# Patient Record
Sex: FEMALE | Race: WHITE | Employment: FULL TIME | URBAN - METROPOLITAN AREA
[De-identification: names, ages, dates, MRNs, and addresses within clinical notes are randomized per-mention and may not be internally consistent; named-entity substitution may affect disease eponyms.]

---

## 2022-01-16 ENCOUNTER — APPOINTMENT (OUTPATIENT)
Dept: CT IMAGING | Age: 72
DRG: 177 | End: 2022-01-16
Payer: MEDICARE

## 2022-01-16 ENCOUNTER — HOSPITAL ENCOUNTER (INPATIENT)
Age: 72
LOS: 3 days | Discharge: HOME | DRG: 177 | End: 2022-01-19
Attending: EMERGENCY MEDICINE | Admitting: INTERNAL MEDICINE
Payer: MEDICARE

## 2022-01-16 DIAGNOSIS — E87.6 HYPOKALEMIA: ICD-10-CM

## 2022-01-16 DIAGNOSIS — R26.2 UNABLE TO AMBULATE: ICD-10-CM

## 2022-01-16 DIAGNOSIS — M62.82 NON-TRAUMATIC RHABDOMYOLYSIS: ICD-10-CM

## 2022-01-16 DIAGNOSIS — U07.1 COVID-19: Primary | ICD-10-CM

## 2022-01-16 LAB
ALBUMIN SERPL-MCNC: 3.3 G/DL (ref 3.5–5.2)
ALP BLD-CCNC: 79 U/L (ref 35–104)
ALT SERPL-CCNC: 26 U/L (ref 0–32)
ANION GAP SERPL CALCULATED.3IONS-SCNC: 15 MMOL/L (ref 7–16)
AST SERPL-CCNC: 25 U/L (ref 0–31)
BACTERIA: ABNORMAL /HPF
BASOPHILS ABSOLUTE: 0.02 E9/L (ref 0–0.2)
BASOPHILS RELATIVE PERCENT: 0.1 % (ref 0–2)
BILIRUB SERPL-MCNC: 0.4 MG/DL (ref 0–1.2)
BILIRUBIN URINE: NEGATIVE
BLOOD, URINE: ABNORMAL
BUN BLDV-MCNC: 15 MG/DL (ref 6–23)
CALCIUM SERPL-MCNC: 8.7 MG/DL (ref 8.6–10.2)
CHLORIDE BLD-SCNC: 101 MMOL/L (ref 98–107)
CLARITY: CLEAR
CO2: 25 MMOL/L (ref 22–29)
COLOR: YELLOW
CREAT SERPL-MCNC: 0.4 MG/DL (ref 0.5–1)
EKG ATRIAL RATE: 96 BPM
EKG P AXIS: 68 DEGREES
EKG P-R INTERVAL: 168 MS
EKG Q-T INTERVAL: 418 MS
EKG QRS DURATION: 84 MS
EKG QTC CALCULATION (BAZETT): 528 MS
EKG R AXIS: 47 DEGREES
EKG T AXIS: 48 DEGREES
EKG VENTRICULAR RATE: 96 BPM
EOSINOPHILS ABSOLUTE: 0.02 E9/L (ref 0.05–0.5)
EOSINOPHILS RELATIVE PERCENT: 0.1 % (ref 0–6)
EPITHELIAL CELLS, UA: ABNORMAL /HPF
GFR AFRICAN AMERICAN: >60
GFR NON-AFRICAN AMERICAN: >60 ML/MIN/1.73
GLUCOSE BLD-MCNC: 137 MG/DL (ref 74–99)
GLUCOSE URINE: NEGATIVE MG/DL
HCT VFR BLD CALC: 41.9 % (ref 34–48)
HEMOGLOBIN: 13.8 G/DL (ref 11.5–15.5)
IMMATURE GRANULOCYTES #: 0.15 E9/L
IMMATURE GRANULOCYTES %: 0.9 % (ref 0–5)
KETONES, URINE: ABNORMAL MG/DL
LACTIC ACID, SEPSIS: 1.1 MMOL/L (ref 0.5–1.9)
LACTIC ACID, SEPSIS: 2.1 MMOL/L (ref 0.5–1.9)
LEUKOCYTE ESTERASE, URINE: NEGATIVE
LYMPHOCYTES ABSOLUTE: 1.73 E9/L (ref 1.5–4)
LYMPHOCYTES RELATIVE PERCENT: 10.1 % (ref 20–42)
MAGNESIUM: 1.8 MG/DL (ref 1.6–2.6)
MCH RBC QN AUTO: 28.5 PG (ref 26–35)
MCHC RBC AUTO-ENTMCNC: 32.9 % (ref 32–34.5)
MCV RBC AUTO: 86.4 FL (ref 80–99.9)
MONOCYTES ABSOLUTE: 0.98 E9/L (ref 0.1–0.95)
MONOCYTES RELATIVE PERCENT: 5.7 % (ref 2–12)
MUCUS: PRESENT /LPF
NEUTROPHILS ABSOLUTE: 14.17 E9/L (ref 1.8–7.3)
NEUTROPHILS RELATIVE PERCENT: 83.1 % (ref 43–80)
NITRITE, URINE: NEGATIVE
PDW BLD-RTO: 12.9 FL (ref 11.5–15)
PH UA: 6 (ref 5–9)
PLATELET # BLD: 581 E9/L (ref 130–450)
PMV BLD AUTO: 9.8 FL (ref 7–12)
POTASSIUM REFLEX MAGNESIUM: 2.7 MMOL/L (ref 3.5–5)
PRO-BNP: 1635 PG/ML (ref 0–125)
PROTEIN UA: 30 MG/DL
RBC # BLD: 4.85 E12/L (ref 3.5–5.5)
RBC UA: ABNORMAL /HPF (ref 0–2)
SARS-COV-2, NAAT: DETECTED
SODIUM BLD-SCNC: 141 MMOL/L (ref 132–146)
SPECIFIC GRAVITY UA: 1.02 (ref 1–1.03)
TOTAL CK: 416 U/L (ref 20–180)
TOTAL PROTEIN: 7 G/DL (ref 6.4–8.3)
TROPONIN, HIGH SENSITIVITY: 23 NG/L (ref 0–9)
UROBILINOGEN, URINE: 1 E.U./DL
WBC # BLD: 17.1 E9/L (ref 4.5–11.5)
WBC UA: ABNORMAL /HPF (ref 0–5)

## 2022-01-16 PROCEDURE — 82550 ASSAY OF CK (CPK): CPT

## 2022-01-16 PROCEDURE — 99223 1ST HOSP IP/OBS HIGH 75: CPT | Performed by: INTERNAL MEDICINE

## 2022-01-16 PROCEDURE — 1200000000 HC SEMI PRIVATE

## 2022-01-16 PROCEDURE — 6370000000 HC RX 637 (ALT 250 FOR IP): Performed by: EMERGENCY MEDICINE

## 2022-01-16 PROCEDURE — 87088 URINE BACTERIA CULTURE: CPT

## 2022-01-16 PROCEDURE — 96375 TX/PRO/DX INJ NEW DRUG ADDON: CPT

## 2022-01-16 PROCEDURE — 96376 TX/PRO/DX INJ SAME DRUG ADON: CPT

## 2022-01-16 PROCEDURE — 83735 ASSAY OF MAGNESIUM: CPT

## 2022-01-16 PROCEDURE — 87635 SARS-COV-2 COVID-19 AMP PRB: CPT

## 2022-01-16 PROCEDURE — 96361 HYDRATE IV INFUSION ADD-ON: CPT

## 2022-01-16 PROCEDURE — 6360000002 HC RX W HCPCS: Performed by: EMERGENCY MEDICINE

## 2022-01-16 PROCEDURE — 85025 COMPLETE CBC W/AUTO DIFF WBC: CPT

## 2022-01-16 PROCEDURE — 93005 ELECTROCARDIOGRAM TRACING: CPT | Performed by: GENERAL PRACTICE

## 2022-01-16 PROCEDURE — 99285 EMERGENCY DEPT VISIT HI MDM: CPT

## 2022-01-16 PROCEDURE — 72125 CT NECK SPINE W/O DYE: CPT

## 2022-01-16 PROCEDURE — 80053 COMPREHEN METABOLIC PANEL: CPT

## 2022-01-16 PROCEDURE — 83880 ASSAY OF NATRIURETIC PEPTIDE: CPT

## 2022-01-16 PROCEDURE — 93010 ELECTROCARDIOGRAM REPORT: CPT | Performed by: INTERNAL MEDICINE

## 2022-01-16 PROCEDURE — 51702 INSERT TEMP BLADDER CATH: CPT

## 2022-01-16 PROCEDURE — 81001 URINALYSIS AUTO W/SCOPE: CPT

## 2022-01-16 PROCEDURE — 96374 THER/PROPH/DIAG INJ IV PUSH: CPT

## 2022-01-16 PROCEDURE — 2580000003 HC RX 258: Performed by: GENERAL PRACTICE

## 2022-01-16 PROCEDURE — 83605 ASSAY OF LACTIC ACID: CPT

## 2022-01-16 PROCEDURE — 84484 ASSAY OF TROPONIN QUANT: CPT

## 2022-01-16 PROCEDURE — 70450 CT HEAD/BRAIN W/O DYE: CPT

## 2022-01-16 PROCEDURE — 74176 CT ABD & PELVIS W/O CONTRAST: CPT

## 2022-01-16 RX ORDER — POTASSIUM CHLORIDE 7.45 MG/ML
10 INJECTION INTRAVENOUS
Status: COMPLETED | OUTPATIENT
Start: 2022-01-16 | End: 2022-01-16

## 2022-01-16 RX ORDER — MAGNESIUM SULFATE IN WATER 40 MG/ML
2000 INJECTION, SOLUTION INTRAVENOUS ONCE
Status: COMPLETED | OUTPATIENT
Start: 2022-01-16 | End: 2022-01-16

## 2022-01-16 RX ORDER — 0.9 % SODIUM CHLORIDE 0.9 %
1000 INTRAVENOUS SOLUTION INTRAVENOUS ONCE
Status: COMPLETED | OUTPATIENT
Start: 2022-01-16 | End: 2022-01-16

## 2022-01-16 RX ADMIN — POTASSIUM CHLORIDE 10 MEQ: 7.45 INJECTION INTRAVENOUS at 18:14

## 2022-01-16 RX ADMIN — POTASSIUM CHLORIDE 10 MEQ: 7.45 INJECTION INTRAVENOUS at 19:41

## 2022-01-16 RX ADMIN — SODIUM CHLORIDE 1000 ML: 9 INJECTION, SOLUTION INTRAVENOUS at 16:13

## 2022-01-16 RX ADMIN — MAGNESIUM SULFATE HEPTAHYDRATE 2000 MG: 40 INJECTION, SOLUTION INTRAVENOUS at 18:14

## 2022-01-16 RX ADMIN — POTASSIUM BICARBONATE 20 MEQ: 782 TABLET, EFFERVESCENT ORAL at 18:14

## 2022-01-16 RX ADMIN — POTASSIUM CHLORIDE 10 MEQ: 7.45 INJECTION INTRAVENOUS at 21:48

## 2022-01-16 ASSESSMENT — PAIN DESCRIPTION - LOCATION: LOCATION: BACK

## 2022-01-16 ASSESSMENT — ENCOUNTER SYMPTOMS
EYE DISCHARGE: 0
SORE THROAT: 0
WHEEZING: 0
SINUS PRESSURE: 0
EYE PAIN: 0
ABDOMINAL DISTENTION: 0
BACK PAIN: 0
VOMITING: 0
NAUSEA: 0
EYE REDNESS: 0
COUGH: 1
SHORTNESS OF BREATH: 0
DIARRHEA: 0

## 2022-01-16 ASSESSMENT — PAIN SCALES - GENERAL: PAINLEVEL_OUTOF10: 8

## 2022-01-16 ASSESSMENT — PAIN DESCRIPTION - PAIN TYPE: TYPE: ACUTE PAIN

## 2022-01-16 ASSESSMENT — PAIN DESCRIPTION - FREQUENCY: FREQUENCY: CONTINUOUS

## 2022-01-16 ASSESSMENT — PAIN DESCRIPTION - ORIENTATION: ORIENTATION: RIGHT;LOWER

## 2022-01-16 NOTE — ED PROVIDER NOTES
ED  Provider Note  Admit Date/RoomTime: 1/16/2022  3:15 PM  ED Room: 05/05     HPI:   Venu Brewer is a 66 y.o. female presenting to the ED for fall, beginning yesterday evening ago. History comes primarily from the patient. There is no problem list on file for this patient. .           The complaint has been persistent, moderate in severity, improved by nothing and worsened by nothing. Associated symptoms include back pain. Nicky Shea has been having back pain consistent with her prior episodes of kidney infections over the course the last 3 weeks. Over the last 2-week she has felt extremely fatigued, and over the last 3 days she has been essentially bedbound secondary to this fatigue. Yesterday evening she attempted to get up to go to the bathroom and collapsed secondary to weakness, and was on the ground for approximately 17 hours. EMS eventually came and brought her to [address] [place] [address] emergency department for further evaluation and treatment. She is unclear if she hit her head in the fall. On arrival, the patient was assessed with history, physical exam, imaging studies, laboratory studies and ekg, vital signs. Vital signs were stable on arrival and the patient was afebrile. Review of Systems   Constitutional: Positive for activity change, appetite change and fatigue. Negative for chills and fever. HENT: Negative for ear pain, sinus pressure and sore throat. Eyes: Negative for pain, discharge and redness. Respiratory: Positive for cough. Negative for shortness of breath and wheezing. Cardiovascular: Negative for chest pain. Gastrointestinal: Negative for abdominal distention, diarrhea, nausea and vomiting. Genitourinary: Negative for dysuria and frequency. Musculoskeletal: Positive for gait problem and myalgias. Negative for arthralgias and back pain. Skin: Negative for rash and wound. Neurological: Negative for weakness and headaches.    Hematological: Negative for adenopathy. All other systems reviewed and are negative. Physical Exam  Vitals and nursing note reviewed. Constitutional:       General: She is not in acute distress. Appearance: She is well-developed. She is ill-appearing. She is not diaphoretic. HENT:      Head: Normocephalic and atraumatic. Eyes:      Pupils: Pupils are equal, round, and reactive to light. Cardiovascular:      Rate and Rhythm: Normal rate and regular rhythm. Pulmonary:      Effort: Pulmonary effort is normal. No respiratory distress. Breath sounds: Normal breath sounds. No wheezing or rales. Abdominal:      General: Bowel sounds are normal.      Palpations: Abdomen is soft. Tenderness: There is no abdominal tenderness. There is no guarding or rebound. Musculoskeletal:      Cervical back: Normal range of motion and neck supple. Skin:     General: Skin is warm and dry. Capillary Refill: Capillary refill takes less than 2 seconds. Neurological:      General: No focal deficit present. Mental Status: She is alert and oriented to person, place, and time. Cranial Nerves: No cranial nerve deficit. Coordination: Coordination normal.            EKG interpretation  Rate 96  Sinus rhythm  Normal axis  No ME, QRS, QT prolongation  No ST segment elevations or depressions  T wave inversions in precordial leads  Nonspecific EKG      Procedures     MDM  Number of Diagnoses or Management Options  COVID-19  Hypokalemia  Non-traumatic rhabdomyolysis  Unable to ambulate  Diagnosis management comments: Emergency department evaluation was notable for findings consistent with COVID-19, rhabdomyolysis, hypokalemia, inability to ambulate. The patient is at high risk of further decompensation would benefit from further evaluation and treatment in the inpatient setting. This information was relayed to the patient who understood this plan of care and was amenable to the plan.   Patient was discussed with the admitting service (Dr. Edith Fong) who concurred with the decision for admission, and have agreed to admit the patient to telemetry               --------------------------------------------- PAST HISTORY ---------------------------------------------  Past Medical History:  has a past medical history of Chronic kidney disease. Past Surgical History:  has a past surgical history that includes Appendectomy. Social History:  reports that she has never smoked. She has never used smokeless tobacco. She reports that she does not drink alcohol and does not use drugs. Family History: family history is not on file. The patients home medications have been reviewed.     Allergies: Codeine, Penicillins, and Sulfa antibiotics    -------------------------------------------------- RESULTS -------------------------------------------------    LABS:  Results for orders placed or performed during the hospital encounter of 01/16/22   COVID-19, Rapid    Specimen: Nasopharyngeal Swab   Result Value Ref Range    SARS-CoV-2, NAAT DETECTED (A) Not Detected   CBC Auto Differential   Result Value Ref Range    WBC 17.1 (H) 4.5 - 11.5 E9/L    RBC 4.85 3.50 - 5.50 E12/L    Hemoglobin 13.8 11.5 - 15.5 g/dL    Hematocrit 41.9 34.0 - 48.0 %    MCV 86.4 80.0 - 99.9 fL    MCH 28.5 26.0 - 35.0 pg    MCHC 32.9 32.0 - 34.5 %    RDW 12.9 11.5 - 15.0 fL    Platelets 355 (H) 361 - 450 E9/L    MPV 9.8 7.0 - 12.0 fL    Neutrophils % 83.1 (H) 43.0 - 80.0 %    Immature Granulocytes % 0.9 0.0 - 5.0 %    Lymphocytes % 10.1 (L) 20.0 - 42.0 %    Monocytes % 5.7 2.0 - 12.0 %    Eosinophils % 0.1 0.0 - 6.0 %    Basophils % 0.1 0.0 - 2.0 %    Neutrophils Absolute 14.17 (H) 1.80 - 7.30 E9/L    Immature Granulocytes # 0.15 E9/L    Lymphocytes Absolute 1.73 1.50 - 4.00 E9/L    Monocytes Absolute 0.98 (H) 0.10 - 0.95 E9/L    Eosinophils Absolute 0.02 (L) 0.05 - 0.50 E9/L    Basophils Absolute 0.02 0.00 - 0.20 E9/L   Comprehensive Metabolic Panel w/ Reflex to MG   Result Value Ref Range    Sodium 141 132 - 146 mmol/L    Potassium reflex Magnesium 2.7 (LL) 3.5 - 5.0 mmol/L    Chloride 101 98 - 107 mmol/L    CO2 25 22 - 29 mmol/L    Anion Gap 15 7 - 16 mmol/L    Glucose 137 (H) 74 - 99 mg/dL    BUN 15 6 - 23 mg/dL    CREATININE 0.4 (L) 0.5 - 1.0 mg/dL    GFR Non-African American >60 >=60 mL/min/1.73    GFR African American >60     Calcium 8.7 8.6 - 10.2 mg/dL    Total Protein 7.0 6.4 - 8.3 g/dL    Albumin 3.3 (L) 3.5 - 5.2 g/dL    Total Bilirubin 0.4 0.0 - 1.2 mg/dL    Alkaline Phosphatase 79 35 - 104 U/L    ALT 26 0 - 32 U/L    AST 25 0 - 31 U/L   Troponin   Result Value Ref Range    Troponin, High Sensitivity 23 (H) 0 - 9 ng/L   Brain Natriuretic Peptide   Result Value Ref Range    Pro-BNP 1,635 (H) 0 - 125 pg/mL   Lactate, Sepsis   Result Value Ref Range    Lactic Acid, Sepsis 2.1 (H) 0.5 - 1.9 mmol/L   CK   Result Value Ref Range    Total  (H) 20 - 180 U/L   Urinalysis, reflex to microscopic   Result Value Ref Range    Color, UA Yellow Straw/Yellow    Clarity, UA Clear Clear    Glucose, Ur Negative Negative mg/dL    Bilirubin Urine Negative Negative    Ketones, Urine TRACE (A) Negative mg/dL    Specific Gravity, UA 1.025 1.005 - 1.030    Blood, Urine SMALL (A) Negative    pH, UA 6.0 5.0 - 9.0    Protein, UA 30 (A) Negative mg/dL    Urobilinogen, Urine 1.0 <2.0 E.U./dL    Nitrite, Urine Negative Negative    Leukocyte Esterase, Urine Negative Negative   Microscopic Urinalysis   Result Value Ref Range    Mucus, UA Present (A) None Seen /LPF    WBC, UA 2-5 0 - 5 /HPF    RBC, UA 5-10 (A) 0 - 2 /HPF    Epithelial Cells, UA FEW /HPF    Bacteria, UA FEW (A) None Seen /HPF   Magnesium   Result Value Ref Range    Magnesium 1.8 1.6 - 2.6 mg/dL   EKG 12 Lead   Result Value Ref Range    Ventricular Rate 96 BPM    Atrial Rate 96 BPM    P-R Interval 168 ms    QRS Duration 84 ms    Q-T Interval 418 ms    QTc Calculation (Bazett) 528 ms    P Axis 68 degrees    R Axis 47 degrees    T Axis 48 degrees       RADIOLOGY:  CT HEAD WO CONTRAST   Final Result   No acute intracranial abnormality. RECOMMENDATIONS:   Unavailable         CT CERVICAL SPINE WO CONTRAST   Final Result   No acute fracture or traumatic malalignment. Degenerative changes of the spine. Partially visualized ground-glass opacities and consolidations in the   visualized lungs. Consider testing for COVID 19. 1.8 cm left thyroid nodule. See recommendation below. CT ABDOMEN PELVIS WO CONTRAST Additional Contrast? None   Final Result   Commonly reported imaging features of COVID-19 pneumonia are present in the   visualized lungs. Other processes such as influenza pneumonia and organizing   pneumonia, as can be significant drug toxicity and connective tissue disease,   can cause a similar imaging pattern. Pyelonephritis cannot be excluded on noncontrast exam.  No evidence of   perinephric abscess. No evidence of hydronephrosis. The gallbladder is distended, which is a nonspecific finding. There are no   other findings to suggest biliary disease. Consider correlation with right   upper quadrant ultrasound to evaluate for cholecystitis, if clinically   indicated. Colonic diverticulosis.                 ------------------------- NURSING NOTES AND VITALS REVIEWED ---------------------------  Date / Time Roomed:  1/16/2022  3:15 PM  ED Bed Assignment:  05/05    The nursing notes within the ED encounter and vital signs as below have been reviewed.      Patient Vitals for the past 24 hrs:   BP Temp Temp src Pulse Resp SpO2 Height Weight   01/16/22 1944 131/69 98.2 °F (36.8 °C) Bladder 94 16 96 % -- --   01/16/22 1740 130/78 97 °F (36.1 °C) Oral 89 16 97 % -- --   01/16/22 1525 (!) 140/87 94.8 °F (34.9 °C) Rectal 85 18 97 % 5' 6\" (1.676 m) 135 lb (61.2 kg)       Oxygen Saturation Interpretation: Normal    ------------------------------------------ PROGRESS NOTES ------------------------------------------  Re-evaluation(s):  Time:  Patients symptoms show no change  Repeat physical examination is not changed    Counseling:  I have spoken with the patient and discussed todays results, in addition to providing specific details for the plan of care and counseling regarding the diagnosis and prognosis. Their questions are answered at this time and they are agreeable with the plan of admission.    --------------------------------- ADDITIONAL PROVIDER NOTES ---------------------------------  Consultations:  Time: 7:49 PM EST. Spoke with Dr. Nirali Delgadillo. Discussed case. They will admit the patient. This patient's ED course included: a personal history and physicial examination, re-evaluation prior to disposition and IV medications    This patient has remained hemodynamically stable during their ED course. Diagnosis:  1. COVID-19    2. Non-traumatic rhabdomyolysis    3. Hypokalemia    4. Unable to ambulate        Disposition:  Patient's disposition: Admit to telemetry  Patient's condition is fair.          Zia Duran 41., DO  Resident  01/16/22 7999

## 2022-01-17 LAB
ANION GAP SERPL CALCULATED.3IONS-SCNC: 12 MMOL/L (ref 7–16)
BUN BLDV-MCNC: 10 MG/DL (ref 6–23)
CALCIUM SERPL-MCNC: 8.3 MG/DL (ref 8.6–10.2)
CHLORIDE BLD-SCNC: 101 MMOL/L (ref 98–107)
CO2: 22 MMOL/L (ref 22–29)
CREAT SERPL-MCNC: 0.3 MG/DL (ref 0.5–1)
GFR AFRICAN AMERICAN: >60
GFR NON-AFRICAN AMERICAN: >60 ML/MIN/1.73
GLUCOSE BLD-MCNC: 120 MG/DL (ref 74–99)
HCT VFR BLD CALC: 35.9 % (ref 34–48)
HEMOGLOBIN: 11.8 G/DL (ref 11.5–15.5)
MAGNESIUM: 2.1 MG/DL (ref 1.6–2.6)
MCH RBC QN AUTO: 28.4 PG (ref 26–35)
MCHC RBC AUTO-ENTMCNC: 32.9 % (ref 32–34.5)
MCV RBC AUTO: 86.5 FL (ref 80–99.9)
PDW BLD-RTO: 13.2 FL (ref 11.5–15)
PLATELET # BLD: 517 E9/L (ref 130–450)
PMV BLD AUTO: 9.7 FL (ref 7–12)
POTASSIUM REFLEX MAGNESIUM: 3.3 MMOL/L (ref 3.5–5)
RBC # BLD: 4.15 E12/L (ref 3.5–5.5)
SODIUM BLD-SCNC: 135 MMOL/L (ref 132–146)
WBC # BLD: 15.8 E9/L (ref 4.5–11.5)

## 2022-01-17 PROCEDURE — 6370000000 HC RX 637 (ALT 250 FOR IP): Performed by: GENERAL PRACTICE

## 2022-01-17 PROCEDURE — 6370000000 HC RX 637 (ALT 250 FOR IP): Performed by: NURSE PRACTITIONER

## 2022-01-17 PROCEDURE — 99232 SBSQ HOSP IP/OBS MODERATE 35: CPT | Performed by: INTERNAL MEDICINE

## 2022-01-17 PROCEDURE — 36415 COLL VENOUS BLD VENIPUNCTURE: CPT

## 2022-01-17 PROCEDURE — 6370000000 HC RX 637 (ALT 250 FOR IP): Performed by: INTERNAL MEDICINE

## 2022-01-17 PROCEDURE — 83735 ASSAY OF MAGNESIUM: CPT

## 2022-01-17 PROCEDURE — 1200000000 HC SEMI PRIVATE

## 2022-01-17 PROCEDURE — 85027 COMPLETE CBC AUTOMATED: CPT

## 2022-01-17 PROCEDURE — 6360000002 HC RX W HCPCS: Performed by: INTERNAL MEDICINE

## 2022-01-17 PROCEDURE — 80048 BASIC METABOLIC PNL TOTAL CA: CPT

## 2022-01-17 PROCEDURE — 2580000003 HC RX 258: Performed by: INTERNAL MEDICINE

## 2022-01-17 RX ORDER — ACETAMINOPHEN 325 MG/1
650 TABLET ORAL EVERY 6 HOURS PRN
Status: DISCONTINUED | OUTPATIENT
Start: 2022-01-17 | End: 2022-01-20 | Stop reason: HOSPADM

## 2022-01-17 RX ORDER — SODIUM CHLORIDE 9 MG/ML
INJECTION, SOLUTION INTRAVENOUS CONTINUOUS
Status: ACTIVE | OUTPATIENT
Start: 2022-01-17 | End: 2022-01-17

## 2022-01-17 RX ORDER — SODIUM CHLORIDE 9 MG/ML
25 INJECTION, SOLUTION INTRAVENOUS PRN
Status: DISCONTINUED | OUTPATIENT
Start: 2022-01-17 | End: 2022-01-20 | Stop reason: HOSPADM

## 2022-01-17 RX ORDER — ONDANSETRON 2 MG/ML
4 INJECTION INTRAMUSCULAR; INTRAVENOUS EVERY 6 HOURS PRN
Status: DISCONTINUED | OUTPATIENT
Start: 2022-01-17 | End: 2022-01-17

## 2022-01-17 RX ORDER — POTASSIUM CHLORIDE 7.45 MG/ML
10 INJECTION INTRAVENOUS PRN
Status: DISCONTINUED | OUTPATIENT
Start: 2022-01-17 | End: 2022-01-20 | Stop reason: HOSPADM

## 2022-01-17 RX ORDER — SODIUM CHLORIDE 0.9 % (FLUSH) 0.9 %
5-40 SYRINGE (ML) INJECTION PRN
Status: DISCONTINUED | OUTPATIENT
Start: 2022-01-17 | End: 2022-01-20 | Stop reason: HOSPADM

## 2022-01-17 RX ORDER — POLYETHYLENE GLYCOL 3350 17 G/17G
17 POWDER, FOR SOLUTION ORAL DAILY PRN
Status: DISCONTINUED | OUTPATIENT
Start: 2022-01-17 | End: 2022-01-20 | Stop reason: HOSPADM

## 2022-01-17 RX ORDER — SODIUM CHLORIDE 0.9 % (FLUSH) 0.9 %
5-40 SYRINGE (ML) INJECTION EVERY 12 HOURS SCHEDULED
Status: DISCONTINUED | OUTPATIENT
Start: 2022-01-17 | End: 2022-01-18

## 2022-01-17 RX ORDER — POTASSIUM CHLORIDE 20 MEQ/1
40 TABLET, EXTENDED RELEASE ORAL PRN
Status: DISCONTINUED | OUTPATIENT
Start: 2022-01-17 | End: 2022-01-20 | Stop reason: HOSPADM

## 2022-01-17 RX ORDER — MAGNESIUM SULFATE IN WATER 40 MG/ML
2000 INJECTION, SOLUTION INTRAVENOUS PRN
Status: DISCONTINUED | OUTPATIENT
Start: 2022-01-17 | End: 2022-01-20 | Stop reason: HOSPADM

## 2022-01-17 RX ORDER — ONDANSETRON 4 MG/1
4 TABLET, ORALLY DISINTEGRATING ORAL EVERY 8 HOURS PRN
Status: DISCONTINUED | OUTPATIENT
Start: 2022-01-17 | End: 2022-01-17

## 2022-01-17 RX ORDER — ACETAMINOPHEN 650 MG/1
650 SUPPOSITORY RECTAL EVERY 6 HOURS PRN
Status: DISCONTINUED | OUTPATIENT
Start: 2022-01-17 | End: 2022-01-20 | Stop reason: HOSPADM

## 2022-01-17 RX ORDER — HYDROCODONE BITARTRATE AND ACETAMINOPHEN 10; 325 MG/1; MG/1
1 TABLET ORAL EVERY 6 HOURS PRN
Status: DISCONTINUED | OUTPATIENT
Start: 2022-01-17 | End: 2022-01-20 | Stop reason: HOSPADM

## 2022-01-17 RX ADMIN — SODIUM CHLORIDE: 9 INJECTION, SOLUTION INTRAVENOUS at 05:05

## 2022-01-17 RX ADMIN — Medication 10 ML: at 20:27

## 2022-01-17 RX ADMIN — ENOXAPARIN SODIUM 40 MG: 100 INJECTION SUBCUTANEOUS at 08:21

## 2022-01-17 RX ADMIN — ACETAMINOPHEN 650 MG: 325 TABLET ORAL at 01:30

## 2022-01-17 RX ADMIN — POTASSIUM CHLORIDE 40 MEQ: 20 TABLET, EXTENDED RELEASE ORAL at 06:11

## 2022-01-17 RX ADMIN — HYDROCODONE BITARTRATE AND ACETAMINOPHEN 1 TABLET: 10; 325 TABLET ORAL at 03:20

## 2022-01-17 RX ADMIN — POTASSIUM BICARBONATE 40 MEQ: 782 TABLET, EFFERVESCENT ORAL at 08:21

## 2022-01-17 RX ADMIN — HYDROCODONE BITARTRATE AND ACETAMINOPHEN 1 TABLET: 10; 325 TABLET ORAL at 22:37

## 2022-01-17 ASSESSMENT — PAIN SCALES - GENERAL
PAINLEVEL_OUTOF10: 5
PAINLEVEL_OUTOF10: 8
PAINLEVEL_OUTOF10: 0
PAINLEVEL_OUTOF10: 8

## 2022-01-17 NOTE — PROGRESS NOTES
Dr. Sara Mercado updated patient leans to right in bed and keeps saying she is falling. States she cant move her arms but then intermittently feeds herself and moves her arms. Patient from home alone. Daughter says patient has psych history.    Will have patient work with PT/OT per Dr. Sara Mercado

## 2022-01-17 NOTE — ACP (ADVANCE CARE PLANNING)
Advance Care Planning     Advance Care Planning Activator (Inpatient)  Conversation Note      Date of ACP Conversation: 1/17/2022     Conversation Conducted with: Becka Lozano    ACP Activator: J Carlos Membreno RN      Health Care Decision Maker:     Current Designated Health Care Decision Maker:     Primary Decision Maker: Zachery Bowser - [phone]  Click here to complete Healthcare Decision Makers including section of the Healthcare Decision Maker Relationship (ie \"Primary\")    Care Preferences    Ventilation: \"If you were in your present state of health and suddenly became very ill and were unable to breathe on your own, what would your preference be about the use of a ventilator (breathing machine) if it were available to you? \"      Would the patient desire the use of ventilator (breathing machine)?: yes    \"If your health worsens and it becomes clear that your chance of recovery is unlikely, what would your preference be about the use of a ventilator (breathing machine) if it were available to you? \"     Would the patient desire the use of ventilator (breathing machine)?: No      Resuscitation  \"CPR works best to restart the heart when there is a sudden event, like a heart attack, in someone who is otherwise healthy. Unfortunately, CPR does not typically restart the heart for people who have serious health conditions or who are very sick. \"    \"In the event your heart stopped as a result of an underlying serious health condition, would you want attempts to be made to restart your heart (answer \"yes\" for attempt to resuscitate) or would you prefer a natural death (answer \"no\" for do not attempt to resuscitate)? \" yes       [] Yes   [x] No   Educated Patient / Othelia Else regarding differences between Advance Directives and portable DNR orders.     Length of ACP Conversation in minutes:  10 minutes    Conversation Outcomes:  [x] ACP discussion completed  [] Existing advance directive reviewed with patient; no changes to patient's previously recorded wishes  [] New Advance Directive completed  [] Portable Do Not Rescitate prepared for Provider review and signature  [] POLST/POST/MOLST/MOST prepared for Provider review and signature      Follow-up plan:    [] Schedule follow-up conversation to continue planning  [x] Referred individual to Provider for additional questions/concerns   [] Advised patient/agent/surrogate to review completed ACP document and update if needed with changes in condition, patient preferences or care setting    [] This note routed to one or more involved healthcare providers

## 2022-01-17 NOTE — PROGRESS NOTES
[facility] Progress Note    Admitting Date and Time: 1/16/2022  3:15 PM  Admit Dx: Hypokalemia [E87.6]  Unable to ambulate [R26.2]  Non-traumatic rhabdomyolysis [M62.82]  COVID-19 [U07.1]    Subjective:  Patient is being followed for Hypokalemia [E87.6]  Unable to ambulate [R26.2]  Non-traumatic rhabdomyolysis [M62.82]  COVID-19 [U07.1]    She came here due to fall at home, she does not have good heating system at home. She is coivd19  Positive but no  respiratory distress. ROS: denies fever, chills, cp, sob, n/v, HA unless stated above.       sodium chloride flush  5-40 mL IntraVENous 2 times per day    enoxaparin  40 mg SubCUTAneous Daily    potassium bicarb-citric acid  40 mEq Oral Daily     sodium chloride flush, 5-40 mL, PRN  sodium chloride, 25 mL, PRN  polyethylene glycol, 17 g, Daily PRN  acetaminophen, 650 mg, Q6H PRN   Or  acetaminophen, 650 mg, Q6H PRN  potassium chloride, 40 mEq, PRN   Or  potassium alternative oral replacement, 40 mEq, PRN   Or  potassium chloride, 10 mEq, PRN  magnesium sulfate, 2,000 mg, PRN  HYDROcodone-acetaminophen, 1 tablet, Q6H PRN         Objective:    BP (!) 151/81   Pulse 113   Temp 97.9 °F (36.6 °C)   Resp 20   Ht 5' 6\" (1.676 m)   Wt 135 lb (61.2 kg)   SpO2 98%   BMI 21.79 kg/m²     General Appearance: alert and oriented to person, place and time and in no acute distress  Skin: warm and dry  Head: normocephalic and atraumatic  Eyes: pupils equal, round, and reactive to light, extraocular eye movements intact, conjunctivae normal  Neck: neck supple and non tender without mass   Pulmonary/Chest: clear to auscultation bilaterally- no wheezes, rales or rhonchi, normal air movement, no respiratory distress  Cardiovascular: normal rate, normal S1 and S2 and no carotid bruits  Abdomen: soft, non-tender, non-distended, normal bowel sounds, no masses or organomegaly  Extremities: no cyanosis, no clubbing and no edema  Neurologic: no cranial nerve deficit and speech normal        Recent Labs     01/16/22  1610 01/17/22  0503    135   K 2.7* 3.3*    101   CO2 25 22   BUN 15 10   CREATININE 0.4* 0.3*   GLUCOSE 137* 120*   CALCIUM 8.7 8.3*       Recent Labs     01/16/22  1610 01/17/22  0503   WBC 17.1* 15.8*   RBC 4.85 4.15   HGB 13.8 11.8   HCT 41.9 35.9   MCV 86.4 86.5   MCH 28.5 28.4   MCHC 32.9 32.9   RDW 12.9 13.2   * 517*   MPV 9.8 9.7           Assessment:    Active Problems:    COVID-19  Resolved Problems:    * No resolved hospital problems. *      Plan:  1. H/o fall at home- patient live herself at home, PT/OT eval and treat. 2.  Covid19 positive: She does not have  Respiratory distress, does not require oxygen. 3.   Hypokalemia: Replaced and F/U BMP. NOTE: This report was transcribed using voice recognition software. Every effort was made to ensure accuracy; however, inadvertent computerized transcription errors may be present.   Electronically signed by Patrick Chavez MD on 1/17/2022 at 1:49 PM

## 2022-01-17 NOTE — CARE COORDINATION
COVID + 1/16. Attempted to call patient- she is unable to reach her phone. Phone conversation w/ daughter Ravinder Cuadra [phone]. Explained role of  and plan of care. Pt was living alone PTA-daughter is an RN at [employer] and plans for her mother to stay with her on discharge- declining TELLY-lives in a 2 story house- 5 total steps to entrance- pt will reside on 1st floor. Prior to admission, pt was driving and working. No Hx DMEs, HHC, or TELLY. DOES NOT have a PCP- PCP Pre-service # placed on discharge instructions- will not be eligible for HHC until she establishes with a PCP-daughter informed. Pharmacy is [facility]. Daughter is requesting psych consult-Hx schiz disorder-charge nurse notified. Daughter will transport home on discharge if pt is able to sit in a wheelchair-awaiting PT/OT evals.  Will follow Aneta Vogt RN case manager

## 2022-01-17 NOTE — H&P
[facility] History and Physical    ASSESSMENT:      Active Problems:    COVID-19  Resolved Problems:    * No resolved hospital problems. *    PLAN:    Covid-25  Debility  41-year-old female presenting with complaints of several days of generalized weakness, malaise, mechanical fall at home due to weakness with no specific injuries. Head CT negative. COVID swab was positive. CT of abdomen picked up evidence of COVID-pneumonia in the both lungs. Patient is saturating well on room air. Medicine was asked to admit due to her inability to care for herself at home, as well as the problems listed below.  -Admit to medical unit  -Continue telemetry  -Supplemental oxygen if needed  -Supportive care  -If becomes hypoxic, start Decadron and consult pharmacy for further treatment  -PT/OT    Hypokalemia  Got potassium in ED  -Recheck in a.m.  -Replace as needed    Elevated CK  ? Early rhabdomyolysis  . No renal failure. Likely prerenal in the setting of inadequate oral intake of fluids during viral illness. -IV fluid  -Monitor renal function    Code Status: Full  DVT prophylaxis: Lovenox    CHIEF COMPLAINT: Fall    History of Present Illness: Patient is a 41-year-old female with past medical history significant for chronic kidney disease. She reportedly does not see doctors regularly does not take any home medications. She presented to the emergency department today with complaints of having a fall at home and being very weak and unable to take care of herself. Her fall occurred when she attempted to get up to go to the bathroom. She does not know if she hit her head but denies any other specific injuries. She believes she was down on the ground for almost an entire day. Ultimately, she was able to summon EMS, who brought her to the ED.   She is also complaining of some flank pain, which she reports is similar to when she had \"kidney infections\" in the past.  Her work-up is significant for a negative UA, though she is positive for COVID-19. She is denying specific respiratory symptoms. She is saturating well on room air in the ED. Her work-up was also significant for hypokalemia with a potassium of 2.7. She has a proBNP of 1635. Her CK is 416. She has a white count with WBCs of 17. CT head is negative for acute process, CT of abdomen shows COVID-19 pneumonia in the visualized lungs, and possible evidence of pyelonephritis, though, as above, UA is negative. Medicine was asked to admit for further treatment of COVID-19, debility. REVIEW OF SYSTEMS:  A comprehensive review of systems was negative except for: what is in the HPI    PMH:  Past Medical History:   Diagnosis Date    Chronic kidney disease        Surgical History:  Past Surgical History:   Procedure Laterality Date    APPENDECTOMY       Medications Prior to Admission:    Prior to Admission medications    Not on File     Allergies:    Codeine, Penicillins, and Sulfa antibiotics    Social History:    reports that she has never smoked. She has never used smokeless tobacco. She reports that she does not drink alcohol and does not use drugs. Family History:   family history is not on file.   Reviewed, not pertinent to admission    PHYSICAL EXAM:  Vitals:  /69   Pulse 94   Temp 98.2 °F (36.8 °C) (Bladder)   Resp 16   Ht 5' 6\" (1.676 m)   Wt 135 lb (61.2 kg)   SpO2 96%   BMI 21.79 kg/m²     General Appearance: alert and oriented to person, place and time and in no acute distress  Skin: warm and dry  Head: normocephalic and atraumatic  Eyes: pupils equal, round, and reactive to light, extraocular eye movements intact, conjunctivae normal  Neck: neck supple and non tender without mass   Pulmonary/Chest: clear to auscultation bilaterally- no wheezes, rales or rhonchi, normal air movement, no respiratory distress  Cardiovascular: normal rate, normal S1 and S2 and no carotid bruits  Abdomen: soft, non-tender, non-distended, normal bowel sounds, no masses or organomegaly  Extremities: no cyanosis, no clubbing and no edema  Neurologic: no cranial nerve deficit and speech normal    LABS:  Recent Labs     01/16/22  1610      K 2.7*      CO2 25   BUN 15   CREATININE 0.4*   GLUCOSE 137*   CALCIUM 8.7     Recent Labs     01/16/22  1610   WBC 17.1*   RBC 4.85   HGB 13.8   HCT 41.9   MCV 86.4   MCH 28.5   MCHC 32.9   RDW 12.9   *   MPV 9.8     No results for input(s): POCGLU in the last 72 hours. CBC:   Lab Results   Component Value Date    WBC 17.1 01/16/2022    RBC 4.85 01/16/2022    HGB 13.8 01/16/2022    HCT 41.9 01/16/2022    MCV 86.4 01/16/2022    MCH 28.5 01/16/2022    MCHC 32.9 01/16/2022    RDW 12.9 01/16/2022     01/16/2022    MPV 9.8 01/16/2022     CMP:    Lab Results   Component Value Date     01/16/2022    K 2.7 01/16/2022     01/16/2022    CO2 25 01/16/2022    BUN 15 01/16/2022    CREATININE 0.4 01/16/2022    GFRAA >60 01/16/2022    LABGLOM >60 01/16/2022    GLUCOSE 137 01/16/2022    PROT 7.0 01/16/2022    LABALBU 3.3 01/16/2022    CALCIUM 8.7 01/16/2022    BILITOT 0.4 01/16/2022    ALKPHOS 79 01/16/2022    AST 25 01/16/2022    ALT 26 01/16/2022     Radiology:   CT HEAD WO CONTRAST   Final Result   No acute intracranial abnormality. RECOMMENDATIONS:   Unavailable         CT CERVICAL SPINE WO CONTRAST   Final Result   No acute fracture or traumatic malalignment. Degenerative changes of the spine. Partially visualized ground-glass opacities and consolidations in the   visualized lungs. Consider testing for COVID 19. 1.8 cm left thyroid nodule. See recommendation below. CT ABDOMEN PELVIS WO CONTRAST Additional Contrast? None   Final Result   Commonly reported imaging features of COVID-19 pneumonia are present in the   visualized lungs.   Other processes such as influenza pneumonia and organizing   pneumonia, as can be significant drug toxicity and connective tissue disease,   can cause a similar imaging pattern. Pyelonephritis cannot be excluded on noncontrast exam.  No evidence of   perinephric abscess. No evidence of hydronephrosis. The gallbladder is distended, which is a nonspecific finding. There are no   other findings to suggest biliary disease. Consider correlation with right   upper quadrant ultrasound to evaluate for cholecystitis, if clinically   indicated. Colonic diverticulosis. NOTE: This report was transcribed using voice recognition software. Every effort was made to ensure accuracy; however, inadvertent computerized transcription errors may be present.   Electronically signed by Marion Yanze DO on 1/16/2022 at 7:57 PM

## 2022-01-18 PROCEDURE — 2580000003 HC RX 258: Performed by: INTERNAL MEDICINE

## 2022-01-18 PROCEDURE — 92610 EVALUATE SWALLOWING FUNCTION: CPT | Performed by: SPEECH-LANGUAGE PATHOLOGIST

## 2022-01-18 PROCEDURE — 99232 SBSQ HOSP IP/OBS MODERATE 35: CPT | Performed by: INTERNAL MEDICINE

## 2022-01-18 PROCEDURE — 6360000002 HC RX W HCPCS: Performed by: INTERNAL MEDICINE

## 2022-01-18 PROCEDURE — 6370000000 HC RX 637 (ALT 250 FOR IP): Performed by: NURSE PRACTITIONER

## 2022-01-18 PROCEDURE — 92523 SPEECH SOUND LANG COMPREHEN: CPT | Performed by: SPEECH-LANGUAGE PATHOLOGIST

## 2022-01-18 PROCEDURE — 6370000000 HC RX 637 (ALT 250 FOR IP): Performed by: GENERAL PRACTICE

## 2022-01-18 PROCEDURE — 97129 THER IVNTJ 1ST 15 MIN: CPT | Performed by: SPEECH-LANGUAGE PATHOLOGIST

## 2022-01-18 PROCEDURE — 97530 THERAPEUTIC ACTIVITIES: CPT

## 2022-01-18 PROCEDURE — 97161 PT EVAL LOW COMPLEX 20 MIN: CPT

## 2022-01-18 PROCEDURE — 92526 ORAL FUNCTION THERAPY: CPT | Performed by: SPEECH-LANGUAGE PATHOLOGIST

## 2022-01-18 PROCEDURE — 1200000000 HC SEMI PRIVATE

## 2022-01-18 PROCEDURE — 97165 OT EVAL LOW COMPLEX 30 MIN: CPT

## 2022-01-18 PROCEDURE — 6370000000 HC RX 637 (ALT 250 FOR IP): Performed by: INTERNAL MEDICINE

## 2022-01-18 RX ORDER — SODIUM CHLORIDE 0.9 % (FLUSH) 0.9 %
5-40 SYRINGE (ML) INJECTION EVERY 12 HOURS SCHEDULED
Status: DISCONTINUED | OUTPATIENT
Start: 2022-01-18 | End: 2022-01-20 | Stop reason: HOSPADM

## 2022-01-18 RX ORDER — POTASSIUM CHLORIDE 20 MEQ/1
40 TABLET, EXTENDED RELEASE ORAL DAILY
Status: DISCONTINUED | OUTPATIENT
Start: 2022-01-18 | End: 2022-01-18 | Stop reason: DRUGHIGH

## 2022-01-18 RX ORDER — POTASSIUM CHLORIDE 20 MEQ/1
40 TABLET, EXTENDED RELEASE ORAL
Status: COMPLETED | OUTPATIENT
Start: 2022-01-18 | End: 2022-01-18

## 2022-01-18 RX ORDER — SODIUM CHLORIDE 0.9 % (FLUSH) 0.9 %
5-40 SYRINGE (ML) INJECTION
Status: DISCONTINUED | OUTPATIENT
Start: 2022-01-18 | End: 2022-01-18 | Stop reason: DRUGHIGH

## 2022-01-18 RX ADMIN — POTASSIUM CHLORIDE 40 MEQ: 20 TABLET, EXTENDED RELEASE ORAL at 17:22

## 2022-01-18 RX ADMIN — Medication 10 ML: at 20:44

## 2022-01-18 RX ADMIN — HYDROCODONE BITARTRATE AND ACETAMINOPHEN 1 TABLET: 10; 325 TABLET ORAL at 06:03

## 2022-01-18 RX ADMIN — HYDROCODONE BITARTRATE AND ACETAMINOPHEN 1 TABLET: 10; 325 TABLET ORAL at 17:21

## 2022-01-18 RX ADMIN — ENOXAPARIN SODIUM 40 MG: 100 INJECTION SUBCUTANEOUS at 08:28

## 2022-01-18 RX ADMIN — POTASSIUM BICARBONATE 40 MEQ: 782 TABLET, EFFERVESCENT ORAL at 08:27

## 2022-01-18 RX ADMIN — Medication 10 ML: at 08:28

## 2022-01-18 RX ADMIN — POTASSIUM CHLORIDE 40 MEQ: 20 TABLET, EXTENDED RELEASE ORAL at 14:29

## 2022-01-18 RX ADMIN — Medication 10 ML: at 14:42

## 2022-01-18 ASSESSMENT — PAIN DESCRIPTION - LOCATION
LOCATION: BACK
LOCATION: BACK

## 2022-01-18 ASSESSMENT — PAIN SCALES - GENERAL
PAINLEVEL_OUTOF10: 6
PAINLEVEL_OUTOF10: 3
PAINLEVEL_OUTOF10: 0
PAINLEVEL_OUTOF10: 8
PAINLEVEL_OUTOF10: 3

## 2022-01-18 ASSESSMENT — PAIN DESCRIPTION - PAIN TYPE
TYPE: ACUTE PAIN
TYPE: ACUTE PAIN

## 2022-01-18 ASSESSMENT — PAIN DESCRIPTION - FREQUENCY: FREQUENCY: INTERMITTENT

## 2022-01-18 ASSESSMENT — PAIN DESCRIPTION - ONSET: ONSET: ON-GOING

## 2022-01-18 ASSESSMENT — PAIN DESCRIPTION - PROGRESSION: CLINICAL_PROGRESSION: GRADUALLY WORSENING

## 2022-01-18 ASSESSMENT — PAIN - FUNCTIONAL ASSESSMENT: PAIN_FUNCTIONAL_ASSESSMENT: PREVENTS OR INTERFERES SOME ACTIVE ACTIVITIES AND ADLS

## 2022-01-18 ASSESSMENT — PAIN DESCRIPTION - DESCRIPTORS: DESCRIPTORS: ACHING;DISCOMFORT

## 2022-01-18 NOTE — PLAN OF CARE
Problem: Falls - Risk of:  Goal: Will remain free from falls  Description: Will remain free from falls  Outcome: Met This Shift  Goal: Absence of physical injury  Description: Absence of physical injury  Outcome: Met This Shift     Problem: Airway Clearance - Ineffective  Goal: Achieve or maintain patent airway  Outcome: Met This Shift     Problem: Gas Exchange - Impaired  Goal: Absence of hypoxia  Outcome: Met This Shift

## 2022-01-18 NOTE — PROGRESS NOTES
SPEECH/LANGUAGE PATHOLOGY  CLINICAL ASSESSMENT OF SWALLOWING FUNCTION   and PLAN OF CARE    PATIENT NAME:  Venu Brewer  (female)     MRN:  [id number]    :  1950  (66 y.o.)  STATUS:  Inpatient: Room 0419/0419-A    TODAY'S DATE:  2022  REFERRING PROVIDER: 22 1015   SLP eval and treat Start: 22 1015, End: 22 1015, ONE TIME, Standing Count: 1 Occurrences, R    Taran Mendoza MD  REASON FOR REFERRAL: pt c/o of difficulty swallowing   EVALUATING THERAPIST: SHANTA Duarte                 RESULTS:    DYSPHAGIA DIAGNOSIS:   Clinical indicators of normal swallow function      DIET RECOMMENDATIONS:  Easy to chew consistency solids (IDDSI level 7, transitional) with  thin liquids (IDDSI level 0)    MEDICATION ADMINISTRATION:  Per patient choice/nursing judgement     FEEDING RECOMMENDATIONS:     Assistance level:  No assistance needed      Compensatory strategies recommended: Small bites/sips      Discussed recommendations with nursing and/or faxed report to referring provider: Yes    SPEECH THERAPY  PLAN OF CARE   The dysphagia POC is established based on physician order, dysphagia diagnosis and results of clinical assessment     Dysphagia therapy is not recommended     Conditions Requiring Skilled Therapeutic Intervention for dysphagia:    Not applicable    Specific dysphagia interventions to include:     Not applicable    Specific instructions for next treatment:  not applicable   Patient Treatment Goals:    Short Term Goals:  Not applicable no therapy warranted     Long Term Goals:   Not applicable no therapy warranted      Patient/family Goal:    not applicable    Plan of care discussed with Patient and Family   The Patient and Family understand(s) the diagnosis, prognosis and plan of care     Rehabilitation Potential/Prognosis: good                    ADMITTING DIAGNOSIS: Hypokalemia [E87.6]  Unable to ambulate [R26.2]  Non-traumatic rhabdomyolysis [M62.82]  COVID-19 [U07.1]    VISIT DIAGNOSIS:   Visit Diagnoses       Codes    Non-traumatic rhabdomyolysis     M62.82    Hypokalemia     E87.6    Unable to ambulate     R26.2           PATIENT REPORT/COMPLAINT: Pt reports she feels \"food gets stuck in sinuses sometimes\"; reports this did not happen during this evaluation    RN cleared patient for participation in assessment     yes     PRIOR LEVEL OF SWALLOW FUNCTION:    PAST HISTORY OF DYSPHAGIA?: none reported    Home diet: Regular consistency solids (IDDSI level 7) with  thin liquids (IDDSI level 0)  Diet during hospital admission: Regular consistency solids (IDDSI level 7) with thin liquids (IDDSI level 0)  Current Diet Order:  ADULT DIET; Regular    PROCEDURE:  Consistencies Administered During the Evaluation   Liquids: thin liquid   Solids:  pureed foods and solid foods      Method of Intake:   cup, straw, spoon  Self fed, Fed by clinician      Position:   Seated, upright    CLINICAL ASSESSMENT:  Oral Stage: The oral stage of swallowing was within functional limits; pt was edentulous during evaluation, dentures are at home. Reports difficulty chewing tough meats. Pharyngeal Stage:    No signs of aspiration were noted during this evaluation however, silent aspiration cannot be ruled out at bedside. If silent aspiration is suspected, a Videofluoroscopic Study of Swallowing (MBS) is recommended and requires a physician order. Cognition:   Within functional limits for this exam    Oral Peripheral Examination   Possible mild left labiobuccal weakness    Current Respiratory Status    room air     Parameters of Speech Production  Respiration:  Adequate for speech production  Quality:   Within functional limits  Intensity: Within functional limits    Volitional Swallow: present     Volitional Cough:   present     Pain: No pain reported.     EDUCATION:   The Speech Language Pathologist (SLP) completed education regarding results of evaluation and that intervention is not warranted at this time. Learner: Patient and Family  Education: Reviewed results and recommendations of this evaluation  Evaluation of Education:  [employer] understanding    This plan may be re-evaluated and revised as warranted. Evaluation Time includes thorough review of current medical information, gathering information on past medical history/social history and prior level of function, completion of standardized testing/informal observation of tasks, assessment of data and education on plan of care and goals. [x]The admitting diagnosis and active problem list, have been reviewed prior to initiation of this evaluation. INTERVENTION    Pt/family educated on above results and plan of care. Pt/family trained on general compensatory strategies for safe swallow to use as needed and signs and symptoms of aspiration (throat clearing, coughing/choking, wet vocal quality. , etc.) and encouraged to notify staff if observed. Handouts provided as warranted. Pt/family encouraged to engage in question/answer session. All questions answered and pt/family verbalized understanding of above. ACTIVE PROBLEM LIST:   Patient Active Problem List   Diagnosis    COVID-19         CPT code:  16290  bedside swallow eval, 32287 dysphagia therapy    Yonny MONROY CCC/SLP [id number]  Speech-Language Pathologist

## 2022-01-18 NOTE — PROGRESS NOTES
SPEECH/LANGUAGE PATHOLOGY  SPEECH/LANGUAGE/COGNITIVE EVALUATION   and PLAN OF CARE      PATIENT NAME:  Jimmy Gong  (female)     MRN:  [id number]    :  1950  (66 y.o.)  STATUS:  Inpatient: Room 0419/0419-A    TODAY'S DATE:  2022  REFERRING PROVIDER:  22 Maribel5   SLP eval and treat Start: 22 1015, End: 22 1015, ONE TIME, Standing Count: 1 Occurrences, Alexus Ley MD  REASON FOR REFERRAL:  Daughter reports STM difficulty, possible confusion  EVALUATING THERAPIST: SHANTA Enriquez    ADMITTING DIAGNOSIS: Hypokalemia [E87.6]  Unable to ambulate [R26.2]  Non-traumatic rhabdomyolysis [M62.82]  COVID-19 [U07.1]    VISIT DIAGNOSIS:   Visit Diagnoses       Codes    Non-traumatic rhabdomyolysis     M62.82    Hypokalemia     E87.6    Unable to ambulate     R26.2           SPEECH THERAPY  PLAN OF CARE   The speech therapy  POC is established based on physician order, speech pathology diagnosis and results of clinical assessment     SPEECH PATHOLOGY DIAGNOSIS:    Speech/langauge/cognition WFL at time of this evaluation    If cognition concerns continue, a more formal evaluation can be ordered/completed    Speech Pathology intervention is not warranted at this time. Conditions Requiring Skilled Therapeutic Intervention for speech, language and/or cognition    Not applicable     Specific Speech Therapy Interventions to Include:   Not applicable    Specific instructions for next treatment:     not applicable    SHORT/LONG TERM GOALS  Not applicable. Therapy is not recommended    Patient goals: Patient/family involved in developing goals and treatment plan:   Treatment goals discussed with Patient and Family    The Patient and Family understand(s) the diagnosis, prognosis and plan of care   The patient/family Agreed with above,     This plan may be re-evaluated and revised as warranted.         Rehabilitation Potential/Prognosis: good                CLINICAL ASSESSMENT:  MOTOR SPEECH Oral Peripheral Examination   Possible mild Left labiobuccal weakness    Parameters of Speech Production  Respiration:  Adequate for speech production  Articulation:  Within functional limits  Resonance:  Within functional limits  Quality:   Within functional limits  Pitch: Within functional limits  Intensity: Within functional limits  Fluency:  Intact  Prosody Intact    RECEPTIVE LANGUAGE    Comprehension of Yes/No Questions: Within functional limits    Process  Simple Verbal Commands:   Within functional limits  Process Intermediate Verbal Commands:   Within functional limits  Process Complex Verbal Commands:     Within functional limits    Comprehension of Conversation:      Within functional limits      EXPRESSIVE LANGUAGE     Serials: Functional    Imitation:  Words   Functional   Sentences Functional    Naming:  (Modality used:  Verbal)  Confrontation Naming  Functional  Functional Description  Functional  Response Naming: Functional    Conversation:      Conversation was within functional limits    COGNITION     Attention/Orientation  Attention: Sustained attention   Orientation:  Oriented to Person, Place, Date    Memory   Immediate Recall: Repeated 3/3    Delayed Recall:   Recalled 2/3    Long Term Recall:   Recalled Address, Birthdate, Age and Family    Organization/Problem Solving/Reasoning   Verbal Sequencing:   Functional        Verbal Problem solving:   Functional          CLINICAL OBSERVATIONS NOTED DURING THE EVALUATION  Within functional limits                  EDUCATION:   The Speech Language Pathologist (SLP) completed education regarding results of evaluation and that intervention is not warranted at this time.   Learner: Patient and Family  Education: Reviewed results and recommendations of this evaluation  Evaluation of Education:  Verbalizes understanding    Evaluation Time includes thorough review of current medical information, gathering information on past medical history/social history and prior level of function, completion of standardized testing/informal observation of tasks, assessment of data and education on plan of care and goals. INTERVENTION    Pt/family educated on above results and plan of care. Pt/family trained on compensatory strategies for STM and educated that more formal testing can be completed as needed. All questions answered and pt/family verbalized understanding of above. CPT code:    74105  eval speech sound lang comprehension, cog tx 15 mins      The admitting diagnosis and active problem list, as listed below have been reviewed prior to initiation of this evaluation. ACTIVE PROBLEM LIST:   Patient Active Problem List   Diagnosis    COVID-19       Cole Opitz A. CCC/SLP [id number]  Speech-Language Pathologist

## 2022-01-18 NOTE — PROGRESS NOTES
Physical Therapy    Facility/Department: [address] INTERNAL MEDICINE 2  Initial Assessment    NAME: Karin Goddard  : 1950  MRN: [id number]    Date of Service: 2022      Patient Diagnosis(es): The primary encounter diagnosis was COVID-19. Diagnoses of Non-traumatic rhabdomyolysis, Hypokalemia, and Unable to ambulate were also pertinent to this visit. has a past medical history of Chronic kidney disease. has a past surgical history that includes Appendectomy. Evaluating Therapist: Keri López PT    Room #:  1630/7437-T  Diagnosis:  Hypokalemia [E87.6]  Unable to ambulate [R26.2]  Non-traumatic rhabdomyolysis [M62.82]  COVID-19 [U07.1]  PMHx/PSHx:  CKD  Precautions:  Falls, droplet plus isolaiton      Social:  Pt lives alone in a 1 floor plan. Independent without device, states she works at [employer] Pt states fell about 2 weeks ago and was on ground 17 hours and has been weaker ever since. Initial Evaluation  Date: 22 Treatment      Short Term/ Long Term   Goals   Was pt agreeable to Eval/treatment? yes     Does pt have pain? Bed Mobility  Rolling: max assist  Supine to sit: max assist of 2  Sit to supine: max assist of 2  Scooting: max assist of 2  Mod assist   Transfers Dependent of 2 to attempt  Mod assist   Ambulation    Unable  10 feet with ww with max assist   Stair Negotiation  Ascended and descended  NT      LE strength     Hips 2-/5 all else 3/5        balance      Sitting balance varies min to max assist     AM-PAC Raw score               7/24         Pt is alert and oriented  LE ROM: WFL  Edema: none  Endurance: fair       ASSESSMENT:    Pt displays functional ability as noted in the objective portion of this evaluation.       Patient education  Pt educated on PT objectives    Patient response to education:   Pt verbalized understanding Pt demonstrated skill Pt requires further education in this area   yes           Comments/treatment: Pt with weakness B UE's and LE's. Weaker proximally than distally. Decrease trunk control sitting edge of bed. Sitting balance varies min to max assist. Attempted to stand pt unable. Pt's/ family goals   1. To get stronger    Conditions Requiring Skilled Therapeutic Intervention:    [x]Decreased strength     []Decreased ROM  [x]Decreased functional mobility  [x]Decreased balance   []Decreased endurance   []Decreased posture  []Decreased sensation  []Decreased coordination   []Decreased vision  []Decreased safety awareness   []Increased pain       Patient and or family understand(s) diagnosis, prognosis, and plan of care. Prognosis is good for reaching above PT goals    PHYSICAL THERAPY PLAN OF CARE:    PT POC is established based on physician order and patient diagnosis     Referring provider/PT Order: Gely Valdez, DO/ PT eval and treat      Current Treatment Recommendations:     [x] Strengthening to improve independence with functional mobility   [] ROM to improve independence with functional mobility   [x] Balance Training to improve static/dynamic balance and to reduce fall risk  [x] Endurance Training to improve activity tolerance during functional mobility   [x] Transfer Training to improve safety and independence with all functional transfers   [x] Gait Training to improve gait mechanics, endurance and assess need for appropriate assistive device  [] Stair Training in preparation for safe discharge home and/or into the community   [x] Positioning to prevent skin breakdown and contractures  [x] Safety and Education Training   [x] Patient/Caregiver Education   [] HEP  [] Other     PT long term treatment goals are located in above grid    Frequency of treatments: 2-5x/week x 7-10 days.     Time in  0955  Time out  1023    Total Treatment Time  12 minutes     Evaluation Time includes thorough review of current medical information, gathering information on past medical history/social history and prior level of

## 2022-01-18 NOTE — PROGRESS NOTES
Occupational Therapy  OCCUPATIONAL THERAPY INITIAL EVALUATION      Date:2022  Patient Name: Ray Tavarez  MRN: [id number]  : 1950  Room: [address] [place] [address]         [id number]                                                  Patient Name: Ray Tavarez    MRN: [id number]    : 1950    Room: [address] [place] [address]      Evaluating OT: Illa Loss OTR/L   [id number]      Referring Flip Villalba DO    Specific Provider Orders/Date:OT eval and treat 2022      Diagnosis:  Hypokalemia [E87.6]  Unable to ambulate [R26.2]  Non-traumatic rhabdomyolysis [M62.82]  COVID-19 [U07.1]   S/p fall at home    Pertinent Medical History: chronic kidney disease , back pain     Precautions:  Fall Risk, DROPLET PLUS     Assessment of current deficits    [x] Functional mobility  [x]ADLs  [x] Strength               []Cognition    [x] Functional transfers   [x] IADLs         [x] Safety Awareness   [x]Endurance    [] Fine Coordination              [x] Balance      [] Vision/perception   []Sensation     []Gross Motor Coordination  [] ROM  [] Delirium                   [] Motor Control     OT PLAN OF CARE   OT POC based on physician orders, patient diagnosis and results of clinical assessment    Frequency/Duration  2-4 days/wk for 2 weeks PRN   Specific OT Treatment Interventions to include:   ADL retraining/adapted techniques and AE recommendations to increase functional independence within precautions                    Energy conservation techniques to improve tolerance for selfcare routine   Functional transfer/mobility training/DME recommendations for increased independence, safety and fall prevention         Patient/family education to increase safety and functional independence             Environmental modifications for safe mobility and completion of ADLs Therapeutic activity to improve functional performance during ADLs. Therapeutic exercise to improve tolerance and functional strength for ADLs    Balance retraining/tolerance tasks for facilitation of postural control with dynamic challenges during ADLs . Positioning to improve functional independence  []    Recommended Adaptive Equipment: TBD     Home Living: Pt lives alone,  1 story  Occupation:  Patient reports she works at [employer] and  had a recent fall there     Prior Level of Function: Independent  with ADLs , Independent  with IADLs; ambulated with no device       Pain Level: no pain this session ;   Cognition: A&O: following commands, pleasant    Memory:  Fair    Sequencing:  fair    Problem solving:  Fair    Judgement/safety:  Fair      Functional Assessment:  AM-PAC Daily Activity Raw Score: 10/24   Initial Eval Status  Date: 1/18/22 Treatment Status  Date: STGs = LTGs  Time frame: 10-14 days   Feeding Min A  Supervision    Grooming Mod A  Attempted to comb her hair   - able to to grasp comb and initiate , unable to lift her arm and  reach top and back of her head   SBA    UB Dressing Max A  Min A    LB Dressing Max A  Min A    Bathing Max A  Min A   Toileting Dependent   Min A   Bed Mobility  Max A x2  Supine <>sit     Min A   Functional Transfers Max Ax2  Attempt sit-stand   Partial stand   Min A   Functional Mobility Unable to fully stand   Min A with good tolerance    Balance Sitting:     Static:  Max A to Min A- decrease trunk control - using  UE to hold onto bedrail   Standing:  Max A x2  CGA - sitting  Min A- standing    Activity Tolerance Fair with light activity   No SOB noted   Good  with ADL activity    Visual/  Perceptual Glasses: none observed                 Hand Dominance right    AROM (PROM) Strength Additional Info:    R/L UE  B shoulder ROM impaired -  AAROM available    Distal AROM WFL  Fair - proximal   Fair+ distal  Fair   and FMC/dexterity noted during ADL tasks         Hearing: [facility]   Sensation:  No c/o numbness or tingling   Tone: WFL   Edema: none observed     Comments: Upon arrival patient lying in bed . At end of session, patient returned to bed  with call light and phone within reach, all lines and tubes intact. *ALARM ON      Overall patient demonstrated  decreased independence and safety during completion of ADL/functional transfer/mobility tasks. Pt would benefit from continued skilled OT to increase safety and independence with completion of ADL/IADL tasks for functional independence and quality of life. Rehab Potential: good for established goals     Patient / Family Goal: none stated       Patient and/or family were instructed on functional diagnosis, prognosis/goals and OT plan of care. Demonstrated fair +  understanding. Eval Complexity: Low    Time In: 0956  Time Out: 1015      Min Units   OT Eval Low 97165 x  1   OT Eval Medium 55702      OT Eval High 42837      OT Re-Eval [id number]       Therapeutic Ex 83745       Therapeutic Activities 79505       ADL/Self Care 10743       Orthotic Management 44052       Manual 82550     Neuro Re-Ed 13966       Non-Billable Time         Evaluation Time additionally includes thorough review of current medical information, gathering information on past medical history/social history and prior level of function, interpretation of standardized testing/informal observation of tasks, assessment of data and development of plan of care and goals.             Carolyne Yanez  OTR/L  OT [id number]

## 2022-01-18 NOTE — CARE COORDINATION
COVID + 1/16. Awaiting speech, PT/OT evals. DOES NOT have a PCP- PCP Pre-service # placed on discharge instructions- will not be eligible for [facility] until she establishes with a PCP. Daughter is requesting a psych consult for her mother- physician was notified per nursing yesterday of daughter's request. Plan remains for pt to stay w/ her daughter Ken Vinod on discharge- daughter is an RN- she is declining TELLY. Will follow.  J Carlos Membreno RN case manager

## 2022-01-18 NOTE — PROGRESS NOTES
[facility] Progress Note    Admitting Date and Time: 1/16/2022  3:15 PM  Admit Dx: Hypokalemia [E87.6]  Unable to ambulate [R26.2]  Non-traumatic rhabdomyolysis [M62.82]  COVID-19 [U07.1]    Subjective:  Patient is being followed for Hypokalemia [E87.6]  Unable to ambulate [R26.2]  Non-traumatic rhabdomyolysis [M62.82]  COVID-19 [U07.1]    She came here due to fall at home,  She is talking CO poisoning which is not possible. ROS: denies fever, chills, cp, sob, n/v, HA unless stated above.       sodium chloride flush  5-40 mL IntraVENous 2 times per day    potassium chloride  40 mEq Oral Q2H    enoxaparin  40 mg SubCUTAneous Daily     sodium chloride flush, 5-40 mL, PRN  sodium chloride, 25 mL, PRN  polyethylene glycol, 17 g, Daily PRN  acetaminophen, 650 mg, Q6H PRN   Or  acetaminophen, 650 mg, Q6H PRN  potassium chloride, 40 mEq, PRN   Or  potassium alternative oral replacement, 40 mEq, PRN   Or  potassium chloride, 10 mEq, PRN  magnesium sulfate, 2,000 mg, PRN  HYDROcodone-acetaminophen, 1 tablet, Q6H PRN         Objective:    BP (!) 156/85   Pulse 112   Temp 97.5 °F (36.4 °C) (Oral)   Resp 18   Ht 5' 6\" (1.676 m)   Wt 135 lb (61.2 kg)   SpO2 100%   BMI 21.79 kg/m²     General Appearance: alert and oriented to person, place and time and in no acute distress  Skin: warm and dry  Head: normocephalic and atraumatic  Eyes: pupils equal, round, and reactive to light, extraocular eye movements intact, conjunctivae normal  Neck: neck supple and non tender without mass   Pulmonary/Chest: clear to auscultation bilaterally- no wheezes, rales or rhonchi, normal air movement, no respiratory distress  Cardiovascular: normal rate, normal S1 and S2 and no carotid bruits  Abdomen: soft, non-tender, non-distended, normal bowel sounds, no masses or organomegaly  Extremities: no cyanosis, no clubbing and no edema  Neurologic: no cranial nerve deficit and speech normal        Recent Labs 01/16/22  1610 01/17/22  0503    135   K 2.7* 3.3*    101   CO2 25 22   BUN 15 10   CREATININE 0.4* 0.3*   GLUCOSE 137* 120*   CALCIUM 8.7 8.3*       Recent Labs     01/16/22  1610 01/17/22  0503   WBC 17.1* 15.8*   RBC 4.85 4.15   HGB 13.8 11.8   HCT 41.9 35.9   MCV 86.4 86.5   MCH 28.5 28.4   MCHC 32.9 32.9   RDW 12.9 13.2   * 517*   MPV 9.8 9.7           Assessment:    Active Problems:    COVID-19  Resolved Problems:    * No resolved hospital problems. *      Plan:  1. H/o fall at home- patient live herself at home, PT/OT eval and treat. 2.  Covid19 positive: She does not have  Respiratory distress, does not require oxygen. 3.   Hypokalemia:improved and replaced again today f/U BMP  4. Abnormal though: her daughter who is a nurse wants her psych evaluation. NOTE: This report was transcribed using voice recognition software. Every effort was made to ensure accuracy; however, inadvertent computerized transcription errors may be present.   Electronically signed by Jenny Vargas MD on 1/18/2022 at 12:47 PM

## 2022-01-19 VITALS
BODY MASS INDEX: 21.69 KG/M2 | OXYGEN SATURATION: 98 % | HEART RATE: 86 BPM | HEIGHT: 66 IN | RESPIRATION RATE: 18 BRPM | TEMPERATURE: 98.4 F | SYSTOLIC BLOOD PRESSURE: 130 MMHG | DIASTOLIC BLOOD PRESSURE: 79 MMHG | WEIGHT: 135 LBS

## 2022-01-19 LAB
ANION GAP SERPL CALCULATED.3IONS-SCNC: 11 MMOL/L (ref 7–16)
BASOPHILS ABSOLUTE: 0.01 E9/L (ref 0–0.2)
BASOPHILS RELATIVE PERCENT: 0.1 % (ref 0–2)
BUN BLDV-MCNC: 14 MG/DL (ref 6–23)
CALCIUM SERPL-MCNC: 8.8 MG/DL (ref 8.6–10.2)
CHLORIDE BLD-SCNC: 94 MMOL/L (ref 98–107)
CO2: 25 MMOL/L (ref 22–29)
CREAT SERPL-MCNC: 0.5 MG/DL (ref 0.5–1)
EOSINOPHILS ABSOLUTE: 0.06 E9/L (ref 0.05–0.5)
EOSINOPHILS RELATIVE PERCENT: 0.6 % (ref 0–6)
GFR AFRICAN AMERICAN: >60
GFR NON-AFRICAN AMERICAN: >60 ML/MIN/1.73
GLUCOSE BLD-MCNC: 101 MG/DL (ref 74–99)
HCT VFR BLD CALC: 37.2 % (ref 34–48)
HEMOGLOBIN: 12.3 G/DL (ref 11.5–15.5)
IMMATURE GRANULOCYTES #: 0.09 E9/L
IMMATURE GRANULOCYTES %: 0.9 % (ref 0–5)
LYMPHOCYTES ABSOLUTE: 2.05 E9/L (ref 1.5–4)
LYMPHOCYTES RELATIVE PERCENT: 21 % (ref 20–42)
MCH RBC QN AUTO: 28.4 PG (ref 26–35)
MCHC RBC AUTO-ENTMCNC: 33.1 % (ref 32–34.5)
MCV RBC AUTO: 85.9 FL (ref 80–99.9)
MONOCYTES ABSOLUTE: 0.88 E9/L (ref 0.1–0.95)
MONOCYTES RELATIVE PERCENT: 9 % (ref 2–12)
NEUTROPHILS ABSOLUTE: 6.68 E9/L (ref 1.8–7.3)
NEUTROPHILS RELATIVE PERCENT: 68.4 % (ref 43–80)
PDW BLD-RTO: 13 FL (ref 11.5–15)
PLATELET # BLD: 540 E9/L (ref 130–450)
PMV BLD AUTO: 9.5 FL (ref 7–12)
POTASSIUM REFLEX MAGNESIUM: 5.1 MMOL/L (ref 3.5–5)
RBC # BLD: 4.33 E12/L (ref 3.5–5.5)
SODIUM BLD-SCNC: 130 MMOL/L (ref 132–146)
URINE CULTURE, ROUTINE: NORMAL
WBC # BLD: 9.8 E9/L (ref 4.5–11.5)

## 2022-01-19 PROCEDURE — 2580000003 HC RX 258: Performed by: INTERNAL MEDICINE

## 2022-01-19 PROCEDURE — 80048 BASIC METABOLIC PNL TOTAL CA: CPT

## 2022-01-19 PROCEDURE — 36415 COLL VENOUS BLD VENIPUNCTURE: CPT

## 2022-01-19 PROCEDURE — 99238 HOSP IP/OBS DSCHRG MGMT 30/<: CPT | Performed by: INTERNAL MEDICINE

## 2022-01-19 PROCEDURE — 6370000000 HC RX 637 (ALT 250 FOR IP): Performed by: INTERNAL MEDICINE

## 2022-01-19 PROCEDURE — 6370000000 HC RX 637 (ALT 250 FOR IP): Performed by: NURSE PRACTITIONER

## 2022-01-19 PROCEDURE — 85025 COMPLETE CBC W/AUTO DIFF WBC: CPT

## 2022-01-19 PROCEDURE — 99443 PR PHYS/QHP TELEPHONE EVALUATION 21-30 MIN: CPT

## 2022-01-19 PROCEDURE — 6360000002 HC RX W HCPCS: Performed by: INTERNAL MEDICINE

## 2022-01-19 RX ADMIN — Medication 10 ML: at 09:37

## 2022-01-19 RX ADMIN — ENOXAPARIN SODIUM 40 MG: 100 INJECTION SUBCUTANEOUS at 09:37

## 2022-01-19 RX ADMIN — HYDROCODONE BITARTRATE AND ACETAMINOPHEN 1 TABLET: 10; 325 TABLET ORAL at 16:17

## 2022-01-19 RX ADMIN — ACETAMINOPHEN 650 MG: 325 TABLET ORAL at 03:16

## 2022-01-19 RX ADMIN — HYDROCODONE BITARTRATE AND ACETAMINOPHEN 1 TABLET: 10; 325 TABLET ORAL at 00:27

## 2022-01-19 ASSESSMENT — PAIN DESCRIPTION - LOCATION
LOCATION: BACK
LOCATION: BACK

## 2022-01-19 ASSESSMENT — PAIN DESCRIPTION - DESCRIPTORS
DESCRIPTORS: ACHING;DISCOMFORT
DESCRIPTORS: ACHING;DISCOMFORT

## 2022-01-19 ASSESSMENT — PAIN DESCRIPTION - PAIN TYPE
TYPE: ACUTE PAIN
TYPE: ACUTE PAIN

## 2022-01-19 ASSESSMENT — PAIN SCALES - GENERAL
PAINLEVEL_OUTOF10: 9
PAINLEVEL_OUTOF10: 8
PAINLEVEL_OUTOF10: 6

## 2022-01-19 ASSESSMENT — PAIN DESCRIPTION - ONSET: ONSET: ON-GOING

## 2022-01-19 ASSESSMENT — PAIN DESCRIPTION - ORIENTATION: ORIENTATION: RIGHT;LEFT

## 2022-01-19 ASSESSMENT — PAIN DESCRIPTION - PROGRESSION: CLINICAL_PROGRESSION: GRADUALLY IMPROVING

## 2022-01-19 ASSESSMENT — PAIN - FUNCTIONAL ASSESSMENT: PAIN_FUNCTIONAL_ASSESSMENT: PREVENTS OR INTERFERES SOME ACTIVE ACTIVITIES AND ADLS

## 2022-01-19 ASSESSMENT — PAIN DESCRIPTION - FREQUENCY: FREQUENCY: CONTINUOUS

## 2022-01-19 NOTE — PLAN OF CARE
Problem: Falls - Risk of:  Goal: Will remain free from falls  1/19/2022 1326 by Britney Clement RN  Outcome: Completed  Goal: Absence of physical injury  1/19/2022 1326 by Britney Clement RN  Outcome: Completed     Problem: Airway Clearance - Ineffective  Goal: Achieve or maintain patent airway  1/19/2022 1326 by Britney Clement RN  Outcome: Completed     Problem: Gas Exchange - Impaired  Goal: Absence of hypoxia  1/19/2022 1326 by Britney Clement RN  Outcome: Completed  Goal: Promote optimal lung function  1/19/2022 1326 by Britney Clement RN  Outcome: Completed     Problem: Breathing Pattern - Ineffective  Goal: Ability to achieve and maintain a regular respiratory rate  1/19/2022 1326 by Britney Clement RN  Outcome: Completed     Problem:  Body Temperature -  Risk of, Imbalanced  Goal: Ability to maintain a body temperature within defined limits  1/19/2022 1326 by Britney Clement RN  Outcome: Completed  Goal: Will regain or maintain usual level of consciousness  1/19/2022 1326 by Britney Clement RN  Outcome: Completed  Goal: Complications related to the disease process, condition or treatment will be avoided or minimized  1/19/2022 1326 by Britney Clement RN  Outcome: Completed     Problem: Isolation Precautions - Risk of Spread of Infection  Goal: Prevent transmission of infection  1/19/2022 1326 by Britney Clement RN  Outcome: Completed     Problem: Nutrition Deficits  Goal: Optimize nutritional status  1/19/2022 1326 by Britney Clement RN  Outcome: Completed     Problem: Risk for Fluid Volume Deficit  Goal: Maintain normal heart rhythm  1/19/2022 1326 by Britney Clement RN  Outcome: Completed  Goal: Maintain absence of muscle cramping  1/19/2022 1326 by Britney Clement RN  Outcome: Completed  Goal: Maintain normal serum potassium, sodium, calcium, phosphorus, and pH  1/19/2022 1326 by Britney Clement RN  Outcome: Completed     Problem: Loneliness or Risk for Loneliness  Goal: Demonstrate

## 2022-01-19 NOTE — PROGRESS NOTES
PSYCHIATRY ATTENDING CONSULT    REASON FOR CONSULT:  Daughter requested consult for Hx of Schizoaffective Disorder    REQUESTING PHYSICIAN:      CHIEF COMPLAINT: \"I am very weak and keep dropping the phone. \"    HISTORY OF PRESENT ILLNESS:  Amada Connors  is a 66 y.o. female who was admitted on 1/16/22 due to hypokalemia and a fall at home where she was laying on the floor for 18 hours before EMS was called. Patient was found to have COVID on admission currently in isolation. Chart reviewed. Note no home psychotropics. UA negative, sodium 130, QTc 528. Spoke to patient via telephone for consultation which she consents to. Patient location [facility]. Provider location [facility]. Currently, John Jacobs is alert and oriented pleasant and cooperative and forthcoming with information. Her thoughts are organized and appropriate on assessment. John Jacobs reports a recent fall at her home where she states she was very weak. Patient admits to weakness currently and states she is having trouble with feeling in her hands and feet and states it is difficult to walk. She denies depression and anxiety. Reports that the fall at home scared her a great deal and is somewhat fearful of being in the hospital now and having COVID. Denies suicidal or homicidal thoughts. Spoke with nursing and her RN states she is not displaying any gilberto or psychosis, just weakness in her arms. She has not witnessed any psychotic behaviors and patient is appropriate. Patient states that she works full time at [employer] and has been there for many years. 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 Spoke with daughter Ken Brock on the phone for collateral information. She reports that she does work at [employer] but does not do flower arrangements. Her daughter states that patient has a history of schizoaffective disorder from her early 25s. She states that her mother is a hoarder and is not taking care of herself at home.  She states that her mother has had mental illness for many years and currently is not in treatment. Patient states that she is going to go and live with her daughter upon discharge. Confirmed that with  note and RN. Patient states she is eating and drinking well. PAST PSYCHIATRIC HISTORY: Patient denies any psychiatric history but daughter reports that she was diagnosed with schizo affective disorder in her 25s. Currently no home psychotropic medications or outside treatment. PAST MEDICAL HISTORY:       Diagnosis Date    Chronic kidney disease        PAST SURGICAL HISTORY:       Procedure Laterality Date    APPENDECTOMY         MEDICATIONS: Current Facility-Administered Medications: sodium chloride flush 0.9 % injection 5-40 mL, 5-40 mL, IntraVENous, 2 times per day  sodium chloride flush 0.9 % injection 5-40 mL, 5-40 mL, IntraVENous, PRN  0.9 % sodium chloride infusion, 25 mL, IntraVENous, PRN  enoxaparin (LOVENOX) injection 40 mg, 40 mg, SubCUTAneous, Daily  polyethylene glycol (GLYCOLAX) packet 17 g, 17 g, Oral, Daily PRN  acetaminophen (TYLENOL) tablet 650 mg, 650 mg, Oral, Q6H PRN **OR** acetaminophen (TYLENOL) suppository 650 mg, 650 mg, Rectal, Q6H PRN  potassium chloride (KLOR-CON M) extended release tablet 40 mEq, 40 mEq, Oral, PRN **OR** potassium bicarb-citric acid (EFFER-K) effervescent tablet 40 mEq, 40 mEq, Oral, PRN **OR** potassium chloride 10 mEq/100 mL IVPB (Peripheral Line), 10 mEq, IntraVENous, PRN  magnesium sulfate 2000 mg in 50 mL IVPB premix, 2,000 mg, IntraVENous, PRN  HYDROcodone-acetaminophen (NORCO)  MG per tablet 1 tablet, 1 tablet, Oral, Q6H PRN    ALLERGIES:  Codeine, Penicillins, and Sulfa antibiotics    FAMILY PSYCHIATRIC HISTORY: Patient states there is no psychiatric family history that she can remember. SOCIAL HISTORY: Born and raised in [address] [place] [address] and lived [place] a farm. Has been  for many years. Has 2 children. Worked at [employer] for 27 years. 6 - 23 mg/dL Final    CREATININE 01/16/2022 0.4* 0.5 - 1.0 mg/dL Final    GFR Non- 01/16/2022 >60  >=60 mL/min/1.73 Final    Comment: Chronic Kidney Disease: less than 60 ml/min/1.73 sq.m. Kidney Failure: less than 15 ml/min/1.73 sq.m. Results valid for patients 18 years and older.  GFR  01/16/2022 >60   Final    Calcium 01/16/2022 8.7  8.6 - 10.2 mg/dL Final    Total Protein 01/16/2022 7.0  6.4 - 8.3 g/dL Final    Albumin 01/16/2022 3.3* 3.5 - 5.2 g/dL Final    Total Bilirubin 01/16/2022 0.4  0.0 - 1.2 mg/dL Final    Alkaline Phosphatase 01/16/2022 79  35 - 104 U/L Final    ALT 01/16/2022 26  0 - 32 U/L Final    AST 01/16/2022 25  0 - 31 U/L Final    Ventricular Rate 01/16/2022 96  BPM Final    Atrial Rate 01/16/2022 96  BPM Final    P-R Interval 01/16/2022 168  ms Final    QRS Duration 01/16/2022 84  ms Final    Q-T Interval 01/16/2022 418  ms Final    QTc Calculation (Bazett) 01/16/2022 528  ms Final    P Axis 01/16/2022 68  degrees Final    R Axis 01/16/2022 47  degrees Final    T Axis 01/16/2022 48  degrees Final    Troponin, High Sensitivity 01/16/2022 23* 0 - 9 ng/L Final    Comment: High Sensitivity Troponin values cannot be compared with  other Troponin methodologies. Patients with high levels of Biotin oral intake (i.e. >5 mg/day)  may have falsely decreased Troponin levels. Samples collected  within 8 hours of biotin intake may require additional information  for diagnosis.       Pro-BNP 01/16/2022 1,635* 0 - 125 pg/mL Final    Lactic Acid, Sepsis 01/16/2022 2.1* 0.5 - 1.9 mmol/L Final    Lactic Acid, Sepsis 01/16/2022 1.1  0.5 - 1.9 mmol/L Final    Total CK 01/16/2022 416* 20 - 180 U/L Final    Color, UA 01/16/2022 Yellow  Straw/Yellow Final    Clarity, UA 01/16/2022 Clear  Clear Final    Glucose, Ur 01/16/2022 Negative  Negative mg/dL Final    Bilirubin Urine 01/16/2022 Negative  Negative Final    Ketones, Urine 01/16/2022 Non- 01/17/2022 >60  >=60 mL/min/1.73 Final    Comment: Chronic Kidney Disease: less than 60 ml/min/1.73 sq.m. Kidney Failure: less than 15 ml/min/1.73 sq.m. Results valid for patients 18 years and older.       GFR  01/17/2022 >60   Final    Calcium 01/17/2022 8.3* 8.6 - 10.2 mg/dL Final    WBC 01/17/2022 15.8* 4.5 - 11.5 E9/L Final    RBC 01/17/2022 4.15  3.50 - 5.50 E12/L Final    Hemoglobin 01/17/2022 11.8  11.5 - 15.5 g/dL Final    Hematocrit 01/17/2022 35.9  34.0 - 48.0 % Final    MCV 01/17/2022 86.5  80.0 - 99.9 fL Final    MCH 01/17/2022 28.4  26.0 - 35.0 pg Final    MCHC 01/17/2022 32.9  32.0 - 34.5 % Final    RDW 01/17/2022 13.2  11.5 - 15.0 fL Final    Platelets 73/77/4678 517* 130 - 450 E9/L Final    MPV 01/17/2022 9.7  7.0 - 12.0 fL Final    Magnesium 01/17/2022 2.1  1.6 - 2.6 mg/dL Final    WBC 01/19/2022 9.8  4.5 - 11.5 E9/L Final    RBC 01/19/2022 4.33  3.50 - 5.50 E12/L Final    Hemoglobin 01/19/2022 12.3  11.5 - 15.5 g/dL Final    Hematocrit 01/19/2022 37.2  34.0 - 48.0 % Final    MCV 01/19/2022 85.9  80.0 - 99.9 fL Final    MCH 01/19/2022 28.4  26.0 - 35.0 pg Final    MCHC 01/19/2022 33.1  32.0 - 34.5 % Final    RDW 01/19/2022 13.0  11.5 - 15.0 fL Final    Platelets 71/99/9188 540* 130 - 450 E9/L Final    MPV 01/19/2022 9.5  7.0 - 12.0 fL Final    Neutrophils % 01/19/2022 68.4  43.0 - 80.0 % Final    Immature Granulocytes % 01/19/2022 0.9  0.0 - 5.0 % Final    Lymphocytes % 01/19/2022 21.0  20.0 - 42.0 % Final    Monocytes % 01/19/2022 9.0  2.0 - 12.0 % Final    Eosinophils % 01/19/2022 0.6  0.0 - 6.0 % Final    Basophils % 01/19/2022 0.1  0.0 - 2.0 % Final    Neutrophils Absolute 01/19/2022 6.68  1.80 - 7.30 E9/L Final    Immature Granulocytes # 01/19/2022 0.09  E9/L Final    Lymphocytes Absolute 01/19/2022 2.05  1.50 - 4.00 E9/L Final    Monocytes Absolute 01/19/2022 0.88  0.10 - 0.95 E9/L Final    Eosinophils Absolute 01/19/2022 0.06  0.05 - 0.50 E9/L Final    Basophils Absolute 01/19/2022 0.01  0.00 - 0.20 E9/L Final    Sodium 01/19/2022 130* 132 - 146 mmol/L Final    Potassium reflex Magnesium 01/19/2022 5.1* 3.5 - 5.0 mmol/L Final    Chloride 01/19/2022 94* 98 - 107 mmol/L Final    CO2 01/19/2022 25  22 - 29 mmol/L Final    Anion Gap 01/19/2022 11  7 - 16 mmol/L Final    Glucose 01/19/2022 101* 74 - 99 mg/dL Final    BUN 01/19/2022 14  6 - 23 mg/dL Final    CREATININE 01/19/2022 0.5  0.5 - 1.0 mg/dL Final    GFR Non- 01/19/2022 >60  >=60 mL/min/1.73 Final    Comment: Chronic Kidney Disease: less than 60 ml/min/1.73 sq.m. Kidney Failure: less than 15 ml/min/1.73 sq.m. Results valid for patients 18 years and older.  GFR  01/19/2022 >60   Final    Calcium 01/19/2022 8.8  8.6 - 10.2 mg/dL Final           MENTAL STATUS EXAMINATION: Female. Pleasant, cooperative,, generally forthcoming. Mood euthymic. Speech clear. Thoughts organized. Content future oriented. Spoke of going home with her daughter. No suicidal, homicidal, paranoia, delusions, hallucinations voiced. Orientation, concentration, recent and remote memory are intact. Fund of knowledge fair. Language use fair. Insight and judgment fair. ASSESSMENT: Acute Stress Reaction NOS     Hx of Schizoaffective        PLAN & RECOMMENDATIONS: Currently patient is not displaying any psychosis or gilberto and has been appropriate during her visit in the hospital.  No psychotropics appropriate at this time. I spoke with Miracle Gonzales, patient's daughter on the phone about patient's schizoaffective disorder. Currently patient is not on any psych meds and does not have any outpatient treatment. We discussed different places that patient can follow-up and be treated. No indication for psychiatric admission at this time. Okay to discharge from my standpoint when medically clear. Psychiatry will sign off.     Total time spent 30 treasure Whitman, WALTER - CNP 1/19/2022 11:11 AM

## 2022-01-19 NOTE — CARE COORDINATION
Anticipating discharge today. Phone conversation w/ daughter Bayron Valles- informed of discharge- IM letter discussed w/ Bayron Valles- plan is for pt to come to [place] home today: [address] , [address] Requesting BSC and WC per [facility]- orders faxed to [facility] with daughter's address for delivery. Pt does not have a PCP- PCP Pre-service # on discharge instructions- pt is not eligible for HHC until she establishes with a PCP- daughter aware. Bayron Valles is requesting ambulance transport to her house- she is aware it is not a guarantee that insurance will pay for transport and that patient may be responsible for cost. Ambulance transportation set up w/ [facility]-  6:30 pm- nursing and VM left w/ daughter Bayron Valles [phone] notifying of discharge pickup time. Lily Coronado transport form on chart. Gilson Weiner RN case manager    [id number]: Per [facility], physician documentation is needed for necessity of wheelchair in the home.  Nursing instructed to fax documentation to [facility] @ [phone] Gilson Weiner RN case manager

## 2022-01-19 NOTE — DISCHARGE SUMMARY
[facility] Physician Discharge Summary       Primary Care Physician  [phone]  Call  Call to establish a PCP in your community. Please inform them you were just discharged from the hospital and need a follow up appointment      Activity level: as tolerated. Dispo:home     Condition on discharge: Stable    Patient ID:  Haroon Butler  [id number]  39 y.o.  1950    Admit date: 1/16/2022    Discharge date and time:  1/19/2022  2:13 PM    Admission Diagnoses: Active Problems:    COVID-19  Resolved Problems:    * No resolved hospital problems. *      Discharge Diagnoses: Active Problems:    COVID-19  Resolved Problems:    * No resolved hospital problems. *      Consults:  IP CONSULT TO PSYCHIATRY    Procedures: none    Hospital Course:   Patient Haroon Butler is a 66 y.o. presented with Hypokalemia [E87.6]  Unable to ambulate [R26.2]  Non-traumatic rhabdomyolysis [M62.82]  COVID-19 [U07.1]  This is 40-year-old  female with no significant past medical history brought in here for history of fall at home. Patient living at home herself but her daughter nearby check with her. COVID test positive but apparently no respiratory symptoms. She did not require any oxygen. Physical therapy and Occupational Therapy evaluated and recommended for rehab but patient's daughter wanted her at home. She has some hallucination on abdominal talking and psych evaluation done recommended nothing for now. Patient need wheel chair for mobility in the home when it is safe to use.   Discharge Exam:    General Appearance: alert and oriented to person, place and time and in no acute distress  Skin: warm and dry  Head: normocephalic and atraumatic  Eyes: pupils equal, round, and reactive to light, extraocular eye movements intact, conjunctivae normal  Neck: neck supple and non tender without mass   Pulmonary/Chest: clear to auscultation bilaterally- no wheezes, rales or rhonchi, normal air movement, no respiratory distress  Cardiovascular: normal rate, normal S1 and S2 and no carotid bruits  Abdomen: soft, non-tender, non-distended, normal bowel sounds, no masses or organomegaly  Extremities: no cyanosis, no clubbing and no edema  Neurologic: no cranial nerve deficit and speech normal    I/O last 3 completed shifts:  In: -   Out: 4150 [Urine:4150]  No intake/output data recorded. LABS:  Recent Labs     01/16/22  1610 01/17/22  0503 01/19/22  0319    135 130*   K 2.7* 3.3* 5.1*    101 94*   CO2 25 22 25   BUN 15 10 14   CREATININE 0.4* 0.3* 0.5   GLUCOSE 137* 120* 101*   CALCIUM 8.7 8.3* 8.8       Recent Labs     01/16/22  1610 01/17/22  0503 01/19/22  0319   WBC 17.1* 15.8* 9.8   RBC 4.85 4.15 4.33   HGB 13.8 11.8 12.3   HCT 41.9 35.9 37.2   MCV 86.4 86.5 85.9   MCH 28.5 28.4 28.4   MCHC 32.9 32.9 33.1   RDW 12.9 13.2 13.0   * 517* 540*   MPV 9.8 9.7 9.5       No results for input(s): POCGLU in the last 72 hours. Imaging:  CT ABDOMEN PELVIS WO CONTRAST Additional Contrast? None    Result Date: 1/16/2022  EXAMINATION: CT OF THE ABDOMEN AND PELVIS WITHOUT CONTRAST 1/16/2022 2:26 pm TECHNIQUE: CT of the abdomen and pelvis was performed without the administration of intravenous contrast. Multiplanar reformatted images are provided for review. Dose modulation, iterative reconstruction, and/or weight based adjustment of the mA/kV was utilized to reduce the radiation dose to as low as reasonably achievable. COMPARISON: None. HISTORY: ORDERING SYSTEM PROVIDED HISTORY: concern for pyelonephritis TECHNOLOGIST PROVIDED HISTORY: Reason for exam:->concern for pyelonephritis Additional Contrast?->None Decision Support Exception - unselect if not a suspected or confirmed emergency medical condition->Emergency Medical Condition (MA) FINDINGS: Lower Chest: Areas of consolidation in the dependent portions of both lungs are partially imaged.   CT cervical spine showed upper lung consolidation is well bilaterally. No hydronephrosis. No evidence of urolithiasis. The bladder is decompressed by Whitaker catheter. No significant perinephric stranding. No perinephric fluid collection. There is a roughly 2 cm right lower pole renal cyst. Status post hysterectomy. Globular right adnexal calcification. No abdominal aortic aneurysm. Mild gallbladder distension without pericholecystic inflammatory change. No biliary dilatation. No evidence of pancreatitis. No free air or free fluid. No bowel obstruction. Colonic diverticulosis without diverticulitis. Degenerative changes of the spine. .     Commonly reported imaging features of COVID-19 pneumonia are present in the visualized lungs. Other processes such as influenza pneumonia and organizing pneumonia, as can be significant drug toxicity and connective tissue disease, can cause a similar imaging pattern. Pyelonephritis cannot be excluded on noncontrast exam.  No evidence of perinephric abscess. No evidence of hydronephrosis. The gallbladder is distended, which is a nonspecific finding. There are no other findings to suggest biliary disease. Consider correlation with right upper quadrant ultrasound to evaluate for cholecystitis, if clinically indicated. Colonic diverticulosis. CT HEAD WO CONTRAST    Result Date: 1/16/2022  EXAMINATION: CT OF THE HEAD WITHOUT CONTRAST  1/16/2022 5:26 pm TECHNIQUE: CT of the head was performed without the administration of intravenous contrast. Dose modulation, iterative reconstruction, and/or weight based adjustment of the mA/kV was utilized to reduce the radiation dose to as low as reasonably achievable. COMPARISON: None. HISTORY: ORDERING SYSTEM PROVIDED HISTORY: fall TECHNOLOGIST PROVIDED HISTORY: Reason for exam:->fall Has a \"code stroke\" or \"stroke alert\" been called? ->No Decision Support Exception - unselect if not a suspected or confirmed emergency medical condition->Emergency Medical Condition (MA) FINDINGS: BRAIN/VENTRICLES: There is no acute intracranial hemorrhage, mass effect or midline shift. No abnormal extra-axial fluid collection. The gray-white differentiation is maintained without evidence of an acute infarct. There is no evidence of hydrocephalus. ORBITS: The visualized portion of the orbits demonstrate no acute abnormality. SINUSES: The visualized paranasal sinuses and mastoid air cells demonstrate no acute abnormality. SOFT TISSUES/SKULL:  No acute abnormality of the visualized skull or soft tissues. No acute intracranial abnormality. RECOMMENDATIONS: Unavailable     CT CERVICAL SPINE WO CONTRAST    Result Date: 1/16/2022  EXAMINATION: CT OF THE CERVICAL SPINE WITHOUT CONTRAST 1/16/2022 2:26 pm TECHNIQUE: CT of the cervical spine was performed without the administration of intravenous contrast. Multiplanar reformatted images are provided for review. Dose modulation, iterative reconstruction, and/or weight based adjustment of the mA/kV was utilized to reduce the radiation dose to as low as reasonably achievable. COMPARISON: None. HISTORY: ORDERING SYSTEM PROVIDED HISTORY: fall TECHNOLOGIST PROVIDED HISTORY: Reason for exam:->fall Decision Support Exception - unselect if not a suspected or confirmed emergency medical condition->Emergency Medical Condition (MA) FINDINGS: Bones: Vertebral body heights and alignment are maintained. No evidence of acute fracture. Fusion the left-sided C4-5 facet joint and partial fusion of the C4-5 vertebral bodies. Lucent foci in the skull represent arachnoid granulations. Multilevel degenerative changes. No evidence of high-grade spinal stenosis. Prevertebral/other: No significant prevertebral soft tissue swelling. Partially visualized ground-glass opacities and consolidations in the lungs. Roughly 1.8 cm left thyroid nodule. There are smaller thyroid nodules also noted. Carotid calcifications. No acute fracture or traumatic malalignment.  Degenerative changes of

## 2022-01-20 ENCOUNTER — CARE COORDINATION (OUTPATIENT)
Dept: CASE MANAGEMENT | Age: 72
End: 2022-01-20

## 2022-01-20 NOTE — CARE COORDINATION
Transitions of Care Call  Call within 2 business days of discharge: Yes    Patient: Anderson Scott Patient : 1950   MRN: <[id number]>  Reason for Admission: 2022 - 2022 [address] [place] [address] Blvd. CV-19, Rhabdomyolysis  Discharge Date: 22 RARS: Readmission Risk Score: 8.3 ( )      Last Discharge [facility]       Complaint Diagnosis Description Type Department Provider    22 Fall COVID-19 . .. ED to Hosp-Admission (Discharged) (ADMITTED) KEIRA RodasW Aline Ashton MD; Daniela Perez,... Initial CV-19 outreach attempt leaving [facility]. Provided YT access line [phone] for assist in establishing PCP.     Jhonatan Diaz RN

## 2022-01-21 ENCOUNTER — CARE COORDINATION (OUTPATIENT)
Dept: CASE MANAGEMENT | Age: 72
End: 2022-01-21

## 2022-01-21 NOTE — CARE COORDINATION
Transitions of Care Call  Call within 2 business days of discharge: Yes    Patient: Serge Ventura Patient : 1950   MRN: <[id number]>  Reason for Admission:  2022 - 2022 [address] [place] [address] Blvd. CV-19, Rhabdomyolysis  Discharge Date: 22 RARS: Readmission Risk Score: 8.3 ( )  NR  CV-19    Last Discharge [facility]       Complaint Diagnosis Description Type Department Provider    22 Fall COVID-19 . .. ED to Hosp-Admission (Discharged) (ADMITTED) KEIRA 4W Shaneka Quiñones MD; Radha Julian,... Second CV-19 outreach attempt leaving Hippa  w/ my outreach information. Provided YT access line [phone] for assist in establishing PCP. CTN s/o.       Krys Chery RN